# Patient Record
Sex: FEMALE | Race: BLACK OR AFRICAN AMERICAN | NOT HISPANIC OR LATINO | Employment: UNEMPLOYED | URBAN - METROPOLITAN AREA
[De-identification: names, ages, dates, MRNs, and addresses within clinical notes are randomized per-mention and may not be internally consistent; named-entity substitution may affect disease eponyms.]

---

## 2023-05-26 ENCOUNTER — LAB (OUTPATIENT)
Dept: LAB | Facility: LAB | Age: 58
End: 2023-05-26
Payer: COMMERCIAL

## 2023-05-26 ENCOUNTER — OFFICE VISIT (OUTPATIENT)
Dept: PRIMARY CARE | Facility: CLINIC | Age: 58
End: 2023-05-26
Payer: COMMERCIAL

## 2023-05-26 VITALS
HEART RATE: 71 BPM | OXYGEN SATURATION: 99 % | BODY MASS INDEX: 26.97 KG/M2 | TEMPERATURE: 97.2 F | SYSTOLIC BLOOD PRESSURE: 115 MMHG | HEIGHT: 59 IN | DIASTOLIC BLOOD PRESSURE: 81 MMHG | WEIGHT: 133.8 LBS

## 2023-05-26 DIAGNOSIS — Z12.31 BREAST CANCER SCREENING BY MAMMOGRAM: ICD-10-CM

## 2023-05-26 DIAGNOSIS — I10 PRIMARY HYPERTENSION: Primary | ICD-10-CM

## 2023-05-26 DIAGNOSIS — E05.90 SUBCLINICAL HYPERTHYROIDISM: ICD-10-CM

## 2023-05-26 DIAGNOSIS — I10 PRIMARY HYPERTENSION: ICD-10-CM

## 2023-05-26 PROBLEM — H92.09 OTALGIA: Status: ACTIVE | Noted: 2023-05-26

## 2023-05-26 PROBLEM — L30.9 ECZEMA: Status: RESOLVED | Noted: 2023-05-26 | Resolved: 2023-05-26

## 2023-05-26 PROBLEM — H18.12 BULLOUS KERATOPATHY OF LEFT EYE: Status: ACTIVE | Noted: 2023-05-26

## 2023-05-26 PROBLEM — G47.00 INSOMNIA: Status: ACTIVE | Noted: 2023-05-26

## 2023-05-26 PROBLEM — Q15.0 CONGENITAL GLAUCOMA: Status: ACTIVE | Noted: 2023-05-26

## 2023-05-26 PROBLEM — Q12.1: Status: ACTIVE | Noted: 2023-05-26

## 2023-05-26 PROBLEM — M62.838 NECK MUSCLE SPASM: Status: RESOLVED | Noted: 2023-05-26 | Resolved: 2023-05-26

## 2023-05-26 PROBLEM — H54.8 LEGALLY BLIND: Status: ACTIVE | Noted: 2023-05-26

## 2023-05-26 PROBLEM — E78.5 HYPERLIPIDEMIA: Status: ACTIVE | Noted: 2023-05-26

## 2023-05-26 PROBLEM — R79.89 ABNORMAL THYROID BLOOD TEST: Status: ACTIVE | Noted: 2023-05-26

## 2023-05-26 PROBLEM — H40.50X3: Status: ACTIVE | Noted: 2023-05-26

## 2023-05-26 PROBLEM — N63.21 BREAST LUMP ON LEFT SIDE AT 2 O'CLOCK POSITION: Status: RESOLVED | Noted: 2023-05-26 | Resolved: 2023-05-26

## 2023-05-26 PROBLEM — L73.9 FOLLICULITIS: Status: RESOLVED | Noted: 2023-05-26 | Resolved: 2023-05-26

## 2023-05-26 PROBLEM — S05.02XA ABRASION OF CORNEA, LEFT: Status: ACTIVE | Noted: 2023-05-26

## 2023-05-26 PROBLEM — R00.2 PALPITATIONS: Status: ACTIVE | Noted: 2023-05-26

## 2023-05-26 PROBLEM — L29.9 ITCHING: Status: RESOLVED | Noted: 2023-05-26 | Resolved: 2023-05-26

## 2023-05-26 PROBLEM — R63.4 WEIGHT LOSS, UNINTENTIONAL: Status: RESOLVED | Noted: 2023-05-26 | Resolved: 2023-05-26

## 2023-05-26 PROBLEM — E66.9 OBESITY, CLASS II, BMI 35-39.9: Status: ACTIVE | Noted: 2023-05-26

## 2023-05-26 PROBLEM — E66.812 OBESITY, CLASS II, BMI 35-39.9: Status: ACTIVE | Noted: 2023-05-26

## 2023-05-26 PROBLEM — H40.1190 CHRONIC OPEN ANGLE GLAUCOMA: Status: ACTIVE | Noted: 2023-05-26

## 2023-05-26 PROBLEM — Z97.0 HISTORY OF EYE PROSTHESIS: Status: ACTIVE | Noted: 2023-05-26

## 2023-05-26 PROBLEM — R53.83 FATIGUE: Status: RESOLVED | Noted: 2023-05-26 | Resolved: 2023-05-26

## 2023-05-26 PROBLEM — E83.52 HYPERCALCEMIA: Status: ACTIVE | Noted: 2023-05-26

## 2023-05-26 PROBLEM — E55.9 VITAMIN D DEFICIENCY: Status: ACTIVE | Noted: 2023-05-26

## 2023-05-26 PROBLEM — Z96.1 PSEUDOPHAKIA OF LEFT EYE: Status: ACTIVE | Noted: 2023-05-26

## 2023-05-26 PROBLEM — R20.0 LEFT ARM NUMBNESS: Status: RESOLVED | Noted: 2023-05-26 | Resolved: 2023-05-26

## 2023-05-26 LAB
ALBUMIN (G/DL) IN SER/PLAS: 4.3 G/DL (ref 3.4–5)
ANION GAP IN SER/PLAS: 10 MMOL/L (ref 10–20)
CALCIUM (MG/DL) IN SER/PLAS: 10.2 MG/DL (ref 8.6–10.6)
CARBON DIOXIDE, TOTAL (MMOL/L) IN SER/PLAS: 35 MMOL/L (ref 21–32)
CHLORIDE (MMOL/L) IN SER/PLAS: 98 MMOL/L (ref 98–107)
CREATININE (MG/DL) IN SER/PLAS: 0.66 MG/DL (ref 0.5–1.05)
GFR FEMALE: >90 ML/MIN/1.73M2
GLUCOSE (MG/DL) IN SER/PLAS: 86 MG/DL (ref 74–99)
PHOSPHATE (MG/DL) IN SER/PLAS: 3.8 MG/DL (ref 2.5–4.9)
POTASSIUM (MMOL/L) IN SER/PLAS: 3.8 MMOL/L (ref 3.5–5.3)
SODIUM (MMOL/L) IN SER/PLAS: 139 MMOL/L (ref 136–145)
THYROTROPIN (MIU/L) IN SER/PLAS BY DETECTION LIMIT <= 0.05 MIU/L: 0.3 MIU/L (ref 0.44–3.98)
THYROXINE (T4) FREE (NG/DL) IN SER/PLAS: 1.05 NG/DL (ref 0.78–1.48)
UREA NITROGEN (MG/DL) IN SER/PLAS: 12 MG/DL (ref 6–23)

## 2023-05-26 PROCEDURE — 84439 ASSAY OF FREE THYROXINE: CPT

## 2023-05-26 PROCEDURE — 80069 RENAL FUNCTION PANEL: CPT

## 2023-05-26 PROCEDURE — 3074F SYST BP LT 130 MM HG: CPT | Performed by: STUDENT IN AN ORGANIZED HEALTH CARE EDUCATION/TRAINING PROGRAM

## 2023-05-26 PROCEDURE — 36415 COLL VENOUS BLD VENIPUNCTURE: CPT

## 2023-05-26 PROCEDURE — 84443 ASSAY THYROID STIM HORMONE: CPT

## 2023-05-26 PROCEDURE — 1036F TOBACCO NON-USER: CPT | Performed by: STUDENT IN AN ORGANIZED HEALTH CARE EDUCATION/TRAINING PROGRAM

## 2023-05-26 PROCEDURE — 99213 OFFICE O/P EST LOW 20 MIN: CPT | Performed by: STUDENT IN AN ORGANIZED HEALTH CARE EDUCATION/TRAINING PROGRAM

## 2023-05-26 PROCEDURE — 3079F DIAST BP 80-89 MM HG: CPT | Performed by: STUDENT IN AN ORGANIZED HEALTH CARE EDUCATION/TRAINING PROGRAM

## 2023-05-26 RX ORDER — TRAVOPROST OPHTHALMIC SOLUTION 0.04 MG/ML
1 SOLUTION OPHTHALMIC
COMMUNITY

## 2023-05-26 RX ORDER — SIMVASTATIN 20 MG/1
20 TABLET, FILM COATED ORAL DAILY
COMMUNITY
End: 2023-09-30 | Stop reason: SDUPTHER

## 2023-05-26 RX ORDER — MOXIFLOXACIN 5 MG/ML
SOLUTION/ DROPS OPHTHALMIC
COMMUNITY
Start: 2023-01-16

## 2023-05-26 RX ORDER — BRIMONIDINE TARTRATE 2 MG/ML
SOLUTION/ DROPS OPHTHALMIC
COMMUNITY

## 2023-05-26 RX ORDER — METOPROLOL SUCCINATE 25 MG/1
25 TABLET, EXTENDED RELEASE ORAL DAILY
COMMUNITY
End: 2023-09-30 | Stop reason: SDUPTHER

## 2023-05-26 RX ORDER — DORZOLAMIDE HYDROCHLORIDE AND TIMOLOL MALEATE 20; 5 MG/ML; MG/ML
SOLUTION/ DROPS OPHTHALMIC
COMMUNITY
End: 2023-10-12 | Stop reason: SDUPTHER

## 2023-05-26 RX ORDER — LATANOPROST 50 UG/ML
SOLUTION/ DROPS OPHTHALMIC
COMMUNITY
End: 2023-10-12 | Stop reason: SDUPTHER

## 2023-05-26 RX ORDER — TRIAMTERENE AND HYDROCHLOROTHIAZIDE 37.5; 25 MG/1; MG/1
1 CAPSULE ORAL DAILY
COMMUNITY
End: 2023-09-30 | Stop reason: SDUPTHER

## 2023-05-26 NOTE — PROGRESS NOTES
"Subjective   Patient ID: Marialuisa Marie is a 57 y.o. female who presents for Follow-up.  #HTN  - Checks BPs at home occasionally, last check 106/75  - She is compliant with current regimen  - Asymptomatic today  - Denies any HA, CP/back pain, dyspnea/orthopnea, N/V, LH/dizziness    #Subclinical Hypothyrodism  - Last TSH 0.42, T4 1.24 (wnl)  - currently not having any symptoms   - On beta-blocker given history of tachycardia  - Currently not on any thyroid medications    #Mammogram  - Requesting annual mammogram,  - No personal or family history of breast cancer  - Past mammograms unremarkable        Objective     /81 (BP Location: Left arm, Patient Position: Sitting)   Pulse 71   Temp 36.2 °C (97.2 °F) (Temporal)   Ht 1.499 m (4' 11\")   Wt 60.7 kg (133 lb 12.8 oz)   SpO2 99%   BMI 27.02 kg/m²     Physical Exam  Vitals reviewed.   Eyes:      Extraocular Movements: Extraocular movements intact.   Cardiovascular:      Rate and Rhythm: Normal rate and regular rhythm.      Heart sounds: Normal heart sounds.   Pulmonary:      Effort: Pulmonary effort is normal. No respiratory distress.      Breath sounds: Normal breath sounds.   Abdominal:      General: Abdomen is flat. Bowel sounds are normal. There is no distension.      Palpations: Abdomen is soft.      Tenderness: There is no abdominal tenderness. There is no guarding.   Musculoskeletal:         General: Normal range of motion.   Skin:     General: Skin is warm and dry.   Neurological:      General: No focal deficit present.      Mental Status: She is alert and oriented to person, place, and time.       Assessment/Plan   Marialuisa Marie is a 57 y.o. female who presents for concerns below    Diagnoses and all orders for this visit:  Primary hypertension  -     Renal Function Panel; Future  Breast cancer screening by mammogram  -     BI mammo bilateral screening tomosynthesis; Future  Subclinical hyperthyroidism  -     Tsh With Reflex To Free T4 If " Abnormal; Future    #Hypertension  - Chronic, at goal  - Continue with current medications  - Reviewed most recent RFP's, stable  - Repeat screening labs today    #Subclinical hypothyroidism  - No acute symptoms or findings on physical exam  - Repeat TSH with reflex to T4 for evaluation  - Further management needed based on repeat labs    #Health maintenance  - Mammogram ordered today, patient plans to get it in June  - Patient due for Pap smear, last Pap in 02/2018, patient defers to next visit  - Patient due for Medicare annual wellness visit to be performed at next visit    Patient discussed with attending: Dr.Boyd Macho Quezada MD  Family Medicine  PGY3

## 2023-06-02 NOTE — PROGRESS NOTES
I reviewed with the resident the medical history and the resident’s findings on physical examination.  I discussed with the resident the patient’s diagnosis and concur with the treatment plan as documented in the resident note.     Follow-up in 3-6 months to reassess thyroid function: sooner if new symptoms suggestive of hyperthyroidism develop.  Kalina Richardson MD

## 2023-06-02 NOTE — PROGRESS NOTES
I reviewed with the resident the medical history and the resident’s findings on physical examination.  I discussed with the resident the patient’s diagnosis and concur with the treatment plan as documented in the resident note.     Follow-up in 3-6 months to reassess thyroid status: sooner if becomes symptomatic (new-onset fatigue, weight loss, diaphoresis, hair loss, palpitations, etc.).  Kalina Richardson MD

## 2023-06-21 DIAGNOSIS — E05.90 SUBCLINICAL HYPERTHYROIDISM: Primary | ICD-10-CM

## 2023-08-08 ENCOUNTER — APPOINTMENT (OUTPATIENT)
Dept: PRIMARY CARE | Facility: CLINIC | Age: 58
End: 2023-08-08
Payer: COMMERCIAL

## 2023-09-07 PROBLEM — H44.519 ABSOLUTE GLAUCOMA: Status: ACTIVE | Noted: 2023-09-07

## 2023-09-07 PROBLEM — T14.8XXA ABRASION OF SKIN: Status: ACTIVE | Noted: 2023-09-07

## 2023-09-07 RX ORDER — BENZONATATE 200 MG/1
CAPSULE ORAL
COMMUNITY
End: 2024-05-30 | Stop reason: SDUPTHER

## 2023-09-07 RX ORDER — BRIMONIDINE TARTRATE 1.5 MG/ML
1 SOLUTION/ DROPS OPHTHALMIC 2 TIMES DAILY
COMMUNITY
End: 2023-10-12 | Stop reason: SDUPTHER

## 2023-09-07 RX ORDER — ERYTHROMYCIN 5 MG/G
OINTMENT OPHTHALMIC 2 TIMES DAILY
COMMUNITY

## 2023-09-29 ENCOUNTER — OFFICE VISIT (OUTPATIENT)
Dept: PRIMARY CARE | Facility: CLINIC | Age: 58
End: 2023-09-29
Payer: COMMERCIAL

## 2023-09-29 VITALS
SYSTOLIC BLOOD PRESSURE: 133 MMHG | HEART RATE: 60 BPM | BODY MASS INDEX: 25.92 KG/M2 | OXYGEN SATURATION: 100 % | DIASTOLIC BLOOD PRESSURE: 90 MMHG | RESPIRATION RATE: 18 BRPM | HEIGHT: 59 IN | TEMPERATURE: 98.1 F | WEIGHT: 128.6 LBS

## 2023-09-29 DIAGNOSIS — E78.2 MIXED HYPERLIPIDEMIA: ICD-10-CM

## 2023-09-29 DIAGNOSIS — L40.9 GENERALIZED PSORIASIS: ICD-10-CM

## 2023-09-29 DIAGNOSIS — I10 PRIMARY HYPERTENSION: ICD-10-CM

## 2023-09-29 DIAGNOSIS — Z12.4 CERVICAL CANCER SCREENING: Primary | ICD-10-CM

## 2023-09-29 DIAGNOSIS — E05.90 SUBCLINICAL HYPERTHYROIDISM: ICD-10-CM

## 2023-09-29 PROCEDURE — 3080F DIAST BP >= 90 MM HG: CPT | Performed by: STUDENT IN AN ORGANIZED HEALTH CARE EDUCATION/TRAINING PROGRAM

## 2023-09-29 PROCEDURE — 87624 HPV HI-RISK TYP POOLED RSLT: CPT

## 2023-09-29 PROCEDURE — 3075F SYST BP GE 130 - 139MM HG: CPT | Performed by: STUDENT IN AN ORGANIZED HEALTH CARE EDUCATION/TRAINING PROGRAM

## 2023-09-29 PROCEDURE — 99213 OFFICE O/P EST LOW 20 MIN: CPT | Performed by: STUDENT IN AN ORGANIZED HEALTH CARE EDUCATION/TRAINING PROGRAM

## 2023-09-29 PROCEDURE — 1036F TOBACCO NON-USER: CPT | Performed by: STUDENT IN AN ORGANIZED HEALTH CARE EDUCATION/TRAINING PROGRAM

## 2023-09-29 PROCEDURE — 88175 CYTOPATH C/V AUTO FLUID REDO: CPT

## 2023-09-29 PROCEDURE — 88141 CYTOPATH C/V INTERPRET: CPT | Performed by: PATHOLOGY

## 2023-09-29 RX ORDER — HYDROCORTISONE 25 MG/G
OINTMENT TOPICAL 2 TIMES DAILY PRN
Qty: 30 G | Refills: 2 | Status: SHIPPED | OUTPATIENT
Start: 2023-09-29 | End: 2024-04-05 | Stop reason: SDUPTHER

## 2023-09-29 ASSESSMENT — PAIN SCALES - GENERAL: PAINLEVEL: 0-NO PAIN

## 2023-09-29 NOTE — PROGRESS NOTES
"Subjective   Patient ID: Marialuisa Marie is a 58 y.o. female who presents for Follow-up (Pap exam ).    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   HENT:  Negative for congestion.    Eyes:  Negative for visual disturbance.   Respiratory:  Negative for cough and shortness of breath.    Cardiovascular:  Negative for chest pain.   Gastrointestinal:  Negative for abdominal pain, diarrhea and nausea.   Genitourinary: Negative.    Musculoskeletal:  Negative for arthralgias and joint swelling.   Neurological:  Negative for dizziness and headaches.   Psychiatric/Behavioral:          No changes in mood or behavior       Objective   /90 (BP Location: Right arm, Patient Position: Sitting, BP Cuff Size: Adult)   Pulse 60   Temp 36.7 °C (98.1 °F) (Temporal)   Resp 18   Ht 1.499 m (4' 11\")   Wt 58.3 kg (128 lb 9.6 oz)   SpO2 100%   BMI 25.97 kg/m²      Physical Exam  Constitutional:       General: She is not in acute distress.  Eyes:      Comments: Wearing sunglasses due to blindness   Cardiovascular:      Rate and Rhythm: Normal rate and regular rhythm.      Heart sounds: Normal heart sounds. No murmur heard.  Pulmonary:      Effort: Pulmonary effort is normal.      Breath sounds: Normal breath sounds.   Abdominal:      General: There is no distension.      Palpations: Abdomen is soft.   Musculoskeletal:         General: Normal range of motion.      Cervical back: " Normal range of motion.   Skin:     Findings: Lesion and rash present.      Comments: Dry, flaky, scaly raised rash on bottom of scalp   Neurological:      Mental Status: She is alert and oriented to person, place, and time.   Psychiatric:         Mood and Affect: Mood normal.         Behavior: Behavior normal.       Assessment/Plan   Diagnoses and all orders for this visit:  Cervical cancer screening  Comments:  Due for pap. Performed today without any issues. Ordered co-testing given age.  Orders:  -     THINPREP PAP TEST  Generalized psoriasis  Comments:  scalp rash concernng for psorasis. Given trial of hydrocortisone cream.  Orders:  -     hydrocortisone 2.5 % ointment; Apply topically 2 times a day as needed for irritation or rash.  Mixed hyperlipidemia  Comments:  Stable. Refilled home simvastatin.  Orders:  -     simvastatin (Zocor) 20 mg tablet; Take 1 tablet (20 mg) by mouth once daily. as directed  Primary hypertension  Comments:  Stable, at goal. Refilled home dyazide and metoprolol succinate 25mg  Orders:  -     triamterene-hydrochlorothiazid (Dyazide) 37.5-25 mg capsule; Take 1 capsule by mouth once daily.  -     metoprolol succinate XL (Toprol-XL) 25 mg 24 hr tablet; Take 1 tablet (25 mg) by mouth once daily.  Subclinical hyperthyroidism  Comments:  Prior history of hyperthyroidism controlled with metoprolol. Will continue metoprol use as it has been helping her hyperthyroid sympotms.  Orders:  -     metoprolol succinate XL (Toprol-XL) 25 mg 24 hr tablet; Take 1 tablet (25 mg) by mouth once daily.  Other orders  -     Follow Up In Primary Care - Established; Future        Follow up/Return to the clinic in 1 month    Attending Supervision: discussed with attending physician, Dr. Jorge Dwyer MD  Family Medicine, PGY-3

## 2023-09-30 RX ORDER — TRIAMTERENE AND HYDROCHLOROTHIAZIDE 37.5; 25 MG/1; MG/1
1 CAPSULE ORAL DAILY
Qty: 90 CAPSULE | Refills: 3 | Status: SHIPPED | OUTPATIENT
Start: 2023-09-30 | End: 2024-04-05 | Stop reason: SDUPTHER

## 2023-09-30 RX ORDER — SIMVASTATIN 20 MG/1
20 TABLET, FILM COATED ORAL DAILY
Qty: 90 TABLET | Refills: 3 | Status: SHIPPED | OUTPATIENT
Start: 2023-09-30 | End: 2024-04-05 | Stop reason: SDUPTHER

## 2023-09-30 RX ORDER — METOPROLOL SUCCINATE 25 MG/1
25 TABLET, EXTENDED RELEASE ORAL DAILY
Qty: 90 TABLET | Refills: 3 | Status: SHIPPED | OUTPATIENT
Start: 2023-09-30 | End: 2024-09-29

## 2023-10-12 ENCOUNTER — OFFICE VISIT (OUTPATIENT)
Dept: OPHTHALMOLOGY | Facility: CLINIC | Age: 58
End: 2023-10-12
Payer: COMMERCIAL

## 2023-10-12 DIAGNOSIS — H40.1123 PRIMARY OPEN ANGLE GLAUCOMA (POAG) OF LEFT EYE, SEVERE STAGE: Primary | ICD-10-CM

## 2023-10-12 DIAGNOSIS — H18.12 BULLOUS KERATOPATHY OF LEFT EYE: ICD-10-CM

## 2023-10-12 PROCEDURE — 99212 OFFICE O/P EST SF 10 MIN: CPT | Performed by: OPHTHALMOLOGY

## 2023-10-12 PROCEDURE — 1036F TOBACCO NON-USER: CPT | Performed by: OPHTHALMOLOGY

## 2023-10-12 PROCEDURE — 76512 OPH US DX B-SCAN: CPT | Mod: LEFT SIDE | Performed by: OPHTHALMOLOGY

## 2023-10-12 RX ORDER — LATANOPROST 50 UG/ML
SOLUTION/ DROPS OPHTHALMIC
Qty: 2.5 ML | Refills: 3 | Status: SHIPPED | OUTPATIENT
Start: 2023-10-12 | End: 2024-05-21 | Stop reason: SDUPTHER

## 2023-10-12 RX ORDER — BRIMONIDINE TARTRATE 1.5 MG/ML
1 SOLUTION/ DROPS OPHTHALMIC 2 TIMES DAILY
Qty: 15 ML | Refills: 3 | Status: SHIPPED | OUTPATIENT
Start: 2023-10-12

## 2023-10-12 RX ORDER — DORZOLAMIDE HYDROCHLORIDE AND TIMOLOL MALEATE 20; 5 MG/ML; MG/ML
SOLUTION/ DROPS OPHTHALMIC
Qty: 15 ML | Refills: 3 | Status: SHIPPED | OUTPATIENT
Start: 2023-10-12 | End: 2023-11-28 | Stop reason: SDUPTHER

## 2023-10-12 ASSESSMENT — SLIT LAMP EXAM - LIDS
COMMENTS: PROSTHESIS
COMMENTS: NORMAL

## 2023-10-12 ASSESSMENT — VISUAL ACUITY
METHOD: SNELLEN - LINEAR
OS_SC: LP

## 2023-10-12 ASSESSMENT — EXTERNAL EXAM - RIGHT EYE: OD_EXAM: PROSTHESIS

## 2023-10-12 ASSESSMENT — ENCOUNTER SYMPTOMS
RESPIRATORY NEGATIVE: 0
ALLERGIC/IMMUNOLOGIC NEGATIVE: 0
CONSTITUTIONAL NEGATIVE: 0
ENDOCRINE NEGATIVE: 0
HEMATOLOGIC/LYMPHATIC NEGATIVE: 0
NEUROLOGICAL NEGATIVE: 0
CARDIOVASCULAR NEGATIVE: 0
GASTROINTESTINAL NEGATIVE: 0
EYES NEGATIVE: 0
MUSCULOSKELETAL NEGATIVE: 0
PSYCHIATRIC NEGATIVE: 0

## 2023-10-12 ASSESSMENT — TONOMETRY
OS_IOP_MMHG: 18
IOP_METHOD: GOLDMANN APPLANATION

## 2023-10-12 NOTE — PROGRESS NOTES
1-STABLE open angle glaucoma (OAG) optic nerve (ON) MEDS, REFILLED  2-DRY EYE TEARS both eyes (OU)  3-PROSTHESIS right eye (OD)  4-CORNEAL EDEMA-

## 2023-10-12 NOTE — PROGRESS NOTES
1-STABLE ENDSTAGE GLAUCOMA--SAME MEDS--REFILLED  2-PROSTHESIS right eye (OD)  3-DRY EYE TEARS  4-CORNEAL EDEMA OS

## 2023-10-14 PROBLEM — Z12.4 CERVICAL CANCER SCREENING: Status: ACTIVE | Noted: 2023-10-14

## 2023-10-14 PROBLEM — L40.9 GENERALIZED PSORIASIS: Status: ACTIVE | Noted: 2023-10-14

## 2023-10-14 ASSESSMENT — ENCOUNTER SYMPTOMS
DIARRHEA: 0
DIZZINESS: 0
ARTHRALGIAS: 0
ABDOMINAL PAIN: 0
COUGH: 0
NAUSEA: 0
FEVER: 0
JOINT SWELLING: 0
CHILLS: 0
HEADACHES: 0
SHORTNESS OF BREATH: 0

## 2023-10-16 LAB
COMPLETE PATHOLOGY REPORT: NORMAL
CONVERTED CLINICAL DIAGNOSIS-HISTORY: NORMAL
CONVERTED DIAGNOSIS COMMENT: NORMAL
CONVERTED FINAL DIAGNOSIS: NORMAL
CONVERTED FINAL REPORT PDF LINK TO COPY AND PASTE: NORMAL

## 2023-10-22 NOTE — PROGRESS NOTES
I reviewed the resident/fellow's documentation and discussed the patient with the resident/fellow. I agree with the resident/fellow's medical decision making as documented in the note.    Lnae Patel MD

## 2023-10-26 NOTE — RESULT ENCOUNTER NOTE
Tried to call patient, and left VM. Results show ASCUS with negative HPV. Per ASCCP guidelines, given patient's age and prior negative pap smears, pt is due to repeat pap in 3 years (no colpo).

## 2023-10-27 ENCOUNTER — DOCUMENTATION (OUTPATIENT)
Dept: OPHTHALMOLOGY | Facility: CLINIC | Age: 58
End: 2023-10-27
Payer: COMMERCIAL

## 2023-10-27 DIAGNOSIS — H18.12 BULLOUS KERATOPATHY OF LEFT EYE: Primary | ICD-10-CM

## 2023-10-27 DIAGNOSIS — I10 PRIMARY HYPERTENSION: ICD-10-CM

## 2023-10-27 DIAGNOSIS — H50.00 ESOTROPIA: ICD-10-CM

## 2023-10-27 RX ORDER — TOBRAMYCIN AND DEXAMETHASONE 3; 1 MG/G; MG/G
0.5 OINTMENT OPHTHALMIC 3 TIMES DAILY
COMMUNITY
End: 2023-10-27 | Stop reason: SDUPTHER

## 2023-10-27 NOTE — PROGRESS NOTES
"Per Dr. Sanchez\"    Please call in tobradex ointment, use 3-4x per day toady and tomorrow.    Sent to pharmacy and notified patient.     -Jhoana    "

## 2023-10-28 RX ORDER — TOBRAMYCIN AND DEXAMETHASONE 3; 1 MG/G; MG/G
0.5 OINTMENT OPHTHALMIC 3 TIMES DAILY
Qty: 3.5 G | Refills: 0 | Status: SHIPPED | OUTPATIENT
Start: 2023-10-28

## 2023-11-04 RX ORDER — TRIAMTERENE AND HYDROCHLOROTHIAZIDE 37.5; 25 MG/1; MG/1
1 CAPSULE ORAL DAILY
Qty: 90 CAPSULE | Refills: 3 | OUTPATIENT
Start: 2023-11-04 | End: 2024-11-03

## 2023-11-13 NOTE — RESULT ENCOUNTER NOTE
Was able to speak with patient over the phone. Patient understanding of ASCUS results and agreeable to repeat pap in 3 years base on ASCCP guidelines

## 2023-11-28 DIAGNOSIS — H40.1123 PRIMARY OPEN ANGLE GLAUCOMA (POAG) OF LEFT EYE, SEVERE STAGE: ICD-10-CM

## 2023-11-28 RX ORDER — DORZOLAMIDE HYDROCHLORIDE AND TIMOLOL MALEATE 20; 5 MG/ML; MG/ML
SOLUTION/ DROPS OPHTHALMIC
Qty: 15 ML | Refills: 3 | Status: SHIPPED | OUTPATIENT
Start: 2023-11-28

## 2023-11-30 NOTE — TELEPHONE ENCOUNTER
11.30/2021  Medication refill received, Medication refilled at [facility]   Medication Dorzolamide/Timolol with 6 refills   NO further action is required at this time,.   Prateek Huynh

## 2024-02-09 ENCOUNTER — APPOINTMENT (OUTPATIENT)
Dept: PRIMARY CARE | Facility: CLINIC | Age: 59
End: 2024-02-09
Payer: COMMERCIAL

## 2024-04-05 ENCOUNTER — OFFICE VISIT (OUTPATIENT)
Dept: PRIMARY CARE | Facility: CLINIC | Age: 59
End: 2024-04-05
Payer: COMMERCIAL

## 2024-04-05 VITALS
HEART RATE: 70 BPM | TEMPERATURE: 98 F | WEIGHT: 129 LBS | HEIGHT: 59 IN | DIASTOLIC BLOOD PRESSURE: 81 MMHG | BODY MASS INDEX: 26 KG/M2 | SYSTOLIC BLOOD PRESSURE: 116 MMHG | OXYGEN SATURATION: 99 %

## 2024-04-05 DIAGNOSIS — L40.9 GENERALIZED PSORIASIS: ICD-10-CM

## 2024-04-05 DIAGNOSIS — M79.672 FOOT PAIN, BILATERAL: ICD-10-CM

## 2024-04-05 DIAGNOSIS — E78.2 MIXED HYPERLIPIDEMIA: ICD-10-CM

## 2024-04-05 DIAGNOSIS — M79.671 FOOT PAIN, BILATERAL: ICD-10-CM

## 2024-04-05 DIAGNOSIS — E05.90 SUBCLINICAL HYPERTHYROIDISM: ICD-10-CM

## 2024-04-05 DIAGNOSIS — I10 PRIMARY HYPERTENSION: Primary | ICD-10-CM

## 2024-04-05 DIAGNOSIS — H54.8 LEGALLY BLIND: ICD-10-CM

## 2024-04-05 PROCEDURE — 3079F DIAST BP 80-89 MM HG: CPT | Performed by: STUDENT IN AN ORGANIZED HEALTH CARE EDUCATION/TRAINING PROGRAM

## 2024-04-05 PROCEDURE — 99213 OFFICE O/P EST LOW 20 MIN: CPT | Performed by: STUDENT IN AN ORGANIZED HEALTH CARE EDUCATION/TRAINING PROGRAM

## 2024-04-05 PROCEDURE — 3074F SYST BP LT 130 MM HG: CPT | Performed by: STUDENT IN AN ORGANIZED HEALTH CARE EDUCATION/TRAINING PROGRAM

## 2024-04-05 RX ORDER — SIMVASTATIN 20 MG/1
20 TABLET, FILM COATED ORAL DAILY
Qty: 90 TABLET | Refills: 3 | Status: SHIPPED | OUTPATIENT
Start: 2024-04-05 | End: 2025-04-05

## 2024-04-05 RX ORDER — HYDROCORTISONE 25 MG/G
OINTMENT TOPICAL 2 TIMES DAILY PRN
Qty: 30 G | Refills: 2 | Status: SHIPPED | OUTPATIENT
Start: 2024-04-05 | End: 2025-04-05

## 2024-04-05 RX ORDER — TRIAMTERENE AND HYDROCHLOROTHIAZIDE 37.5; 25 MG/1; MG/1
1 CAPSULE ORAL DAILY
Qty: 90 CAPSULE | Refills: 3 | Status: SHIPPED | OUTPATIENT
Start: 2024-04-05 | End: 2025-04-05

## 2024-04-05 ASSESSMENT — PAIN SCALES - GENERAL: PAINLEVEL: 0-NO PAIN

## 2024-04-05 NOTE — PROGRESS NOTES
"Subjective   Patient ID: Marialuisa Marie is a 58 y.o. female who presents for Follow-up.    HPI      #Blindness  - following w/ optho  - use of blind cane  - asking for podiatry referral to help trim toenails    # HTN  - Current medication: hydrochlorothiazide-triamterene  - Frequency of home BP checks: about once a day  - Home blood pressure ranges: 100-105/70s  - Denies headache, chest pain, SOB, palpitations, edema, dizziness, and LOC/syncope.     #Subclinical Hyperthyroidism  - currently on metoprolol for it  - pt wants to know if she still needs it  - denies any hyperthyroidism symptoms  - denies neck enlargement    Review of Systems   All other systems reviewed and are negative.      Objective   /81 (BP Location: Right arm, Patient Position: Sitting)   Pulse 70   Temp 36.7 °C (98 °F)   Ht 1.499 m (4' 11\")   Wt 58.5 kg (129 lb)   SpO2 99%   BMI 26.05 kg/m²      Physical Exam  Constitutional:       General: She is not in acute distress.  HENT:      Head: Normocephalic and atraumatic.   Eyes:      Comments: Wearing dark glasses   Cardiovascular:      Heart sounds: Normal heart sounds. No murmur heard.  Pulmonary:      Effort: Pulmonary effort is normal.      Breath sounds: Normal breath sounds.   Abdominal:      Palpations: Abdomen is soft.   Musculoskeletal:         General: Normal range of motion.      Right foot: Normal range of motion.      Left foot: Normal range of motion. Bunion present.   Feet:      Right foot:      Toenail Condition: Right toenails are long.      Left foot:      Toenail Condition: Left toenails are long.   Neurological:      Mental Status: She is alert and oriented to person, place, and time.      Comments: Legally blind   Psychiatric:         Mood and Affect: Mood normal.          Assessment/Plan   Diagnoses and all orders for this visit:  Primary hypertension  Comments:  Stable, at goal. Refilled home dyazide. Ordered CMP to monitor Cr and electrolytes.  Orders:  -     " triamterene-hydrochlorothiazid (Dyazide) 37.5-25 mg capsule; Take 1 capsule by mouth once daily.  -     Comprehensive metabolic panel; Future  Mixed hyperlipidemia  Comments:  Stable. Refilled home simvastatin. Ordered lipid panel and LFTs for surveillance.  Orders:  -     simvastatin (Zocor) 20 mg tablet; Take 1 tablet (20 mg) by mouth once daily. as directed  -     Lipid panel; Future  Subclinical hyperthyroidism  Comments:  Prior dx of hyperthyroidism controlled w/ metoprolol. Will not refill metoprolol as may no longer be needed given side effects. Ordered repeat thyroid testing  Orders:  -     TSH; Future  -     T3, free; Future  -     T4; Future  Generalized psoriasis  Comments:  Scalp rash c/f psorasis, improved in one spot now w/ new spot. Refilled hydrocortisone cream as it helped. May consider punch bx & Derm referral to next visit  Orders:  -     hydrocortisone 2.5 % ointment; Apply topically 2 times a day as needed for irritation or rash.  -     Referral to Dermatology  Legally blind  -     Referral to Podiatry; Future  Foot pain, bilateral  Comments:  Foot pain from long toenails. No fungus or wounds on feet. Referred to Podiatry for nail trimming per pt preference  Orders:  -     Referral to Podiatry; Future      Follow up/Return to the clinic in 1 month    Attending Supervision: discussed with attending physician, Dr. Dylan Dwyer MD  Family Medicine, PGY-3

## 2024-04-06 PROBLEM — E66.9 OBESITY, CLASS II, BMI 35-39.9: Status: RESOLVED | Noted: 2023-05-26 | Resolved: 2024-04-06

## 2024-04-06 PROBLEM — M79.671 FOOT PAIN, BILATERAL: Status: ACTIVE | Noted: 2024-04-06

## 2024-04-06 PROBLEM — H92.09 OTALGIA: Status: RESOLVED | Noted: 2023-05-26 | Resolved: 2024-04-06

## 2024-04-06 PROBLEM — E83.52 HYPERCALCEMIA: Status: RESOLVED | Noted: 2023-05-26 | Resolved: 2024-04-06

## 2024-04-06 PROBLEM — T14.8XXA ABRASION OF SKIN: Status: RESOLVED | Noted: 2023-09-07 | Resolved: 2024-04-06

## 2024-04-06 PROBLEM — R00.2 PALPITATIONS: Status: RESOLVED | Noted: 2023-05-26 | Resolved: 2024-04-06

## 2024-04-06 PROBLEM — G47.00 INSOMNIA: Status: RESOLVED | Noted: 2023-05-26 | Resolved: 2024-04-06

## 2024-04-06 PROBLEM — E66.812 OBESITY, CLASS II, BMI 35-39.9: Status: RESOLVED | Noted: 2023-05-26 | Resolved: 2024-04-06

## 2024-04-06 PROBLEM — E55.9 VITAMIN D DEFICIENCY: Status: RESOLVED | Noted: 2023-05-26 | Resolved: 2024-04-06

## 2024-04-06 PROBLEM — R79.89 ABNORMAL THYROID BLOOD TEST: Status: RESOLVED | Noted: 2023-05-26 | Resolved: 2024-04-06

## 2024-04-06 PROBLEM — M79.672 FOOT PAIN, BILATERAL: Status: ACTIVE | Noted: 2024-04-06

## 2024-04-12 ENCOUNTER — LAB (OUTPATIENT)
Dept: LAB | Facility: LAB | Age: 59
End: 2024-04-12
Payer: COMMERCIAL

## 2024-04-12 DIAGNOSIS — E05.90 SUBCLINICAL HYPERTHYROIDISM: ICD-10-CM

## 2024-04-12 DIAGNOSIS — I10 PRIMARY HYPERTENSION: ICD-10-CM

## 2024-04-12 DIAGNOSIS — E78.2 MIXED HYPERLIPIDEMIA: ICD-10-CM

## 2024-04-12 LAB
ALBUMIN SERPL BCP-MCNC: 4.4 G/DL (ref 3.4–5)
ALP SERPL-CCNC: 76 U/L (ref 33–110)
ALT SERPL W P-5'-P-CCNC: 14 U/L (ref 7–45)
ANION GAP SERPL CALC-SCNC: 15 MMOL/L (ref 10–20)
AST SERPL W P-5'-P-CCNC: 15 U/L (ref 9–39)
BILIRUB SERPL-MCNC: 0.4 MG/DL (ref 0–1.2)
BUN SERPL-MCNC: 11 MG/DL (ref 6–23)
CALCIUM SERPL-MCNC: 10.2 MG/DL (ref 8.6–10.6)
CHLORIDE SERPL-SCNC: 98 MMOL/L (ref 98–107)
CHOLEST SERPL-MCNC: 171 MG/DL (ref 0–199)
CHOLESTEROL/HDL RATIO: 3.1
CO2 SERPL-SCNC: 32 MMOL/L (ref 21–32)
CREAT SERPL-MCNC: 0.67 MG/DL (ref 0.5–1.05)
EGFRCR SERPLBLD CKD-EPI 2021: >90 ML/MIN/1.73M*2
GLUCOSE SERPL-MCNC: 90 MG/DL (ref 74–99)
HDLC SERPL-MCNC: 54.9 MG/DL
LDLC SERPL CALC-MCNC: 100 MG/DL
NON HDL CHOLESTEROL: 116 MG/DL (ref 0–149)
POTASSIUM SERPL-SCNC: 3.7 MMOL/L (ref 3.5–5.3)
PROT SERPL-MCNC: 8.1 G/DL (ref 6.4–8.2)
SODIUM SERPL-SCNC: 141 MMOL/L (ref 136–145)
T3FREE SERPL-MCNC: 3.1 PG/ML (ref 2.3–4.2)
T4 SERPL-MCNC: 11.6 UG/DL (ref 4.5–11.1)
TRIGL SERPL-MCNC: 79 MG/DL (ref 0–149)
TSH SERPL-ACNC: 0.38 MIU/L (ref 0.44–3.98)
VLDL: 16 MG/DL (ref 0–40)

## 2024-04-12 PROCEDURE — 80053 COMPREHEN METABOLIC PANEL: CPT

## 2024-04-12 PROCEDURE — 84443 ASSAY THYROID STIM HORMONE: CPT

## 2024-04-12 PROCEDURE — 84481 FREE ASSAY (FT-3): CPT

## 2024-04-12 PROCEDURE — 36415 COLL VENOUS BLD VENIPUNCTURE: CPT

## 2024-04-12 PROCEDURE — 84436 ASSAY OF TOTAL THYROXINE: CPT

## 2024-04-12 PROCEDURE — 80061 LIPID PANEL: CPT

## 2024-04-12 NOTE — PROGRESS NOTES
I reviewed the resident/fellow's documentation and discussed the patient with the resident/fellow. I agree with the resident/fellow's medical decision making as documented in their note with the exception/addition of the following:    Patient presented today for medication management and evaluation of chronic condition(s).  Kalina Richardson MD

## 2024-04-17 ENCOUNTER — APPOINTMENT (OUTPATIENT)
Dept: RADIOLOGY | Facility: HOSPITAL | Age: 59
End: 2024-04-17
Payer: COMMERCIAL

## 2024-04-17 ENCOUNTER — HOSPITAL ENCOUNTER (EMERGENCY)
Facility: HOSPITAL | Age: 59
Discharge: HOME | End: 2024-04-17
Payer: COMMERCIAL

## 2024-04-17 VITALS
OXYGEN SATURATION: 98 % | RESPIRATION RATE: 16 BRPM | SYSTOLIC BLOOD PRESSURE: 136 MMHG | TEMPERATURE: 97.2 F | HEART RATE: 84 BPM | DIASTOLIC BLOOD PRESSURE: 88 MMHG

## 2024-04-17 DIAGNOSIS — M54.50 ACUTE RIGHT-SIDED LOW BACK PAIN WITHOUT SCIATICA: ICD-10-CM
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
## 2024-04-17 PROCEDURE — 99283 EMERGENCY DEPT VISIT LOW MDM: CPT

## 2024-04-17 PROCEDURE — 72100 X-RAY EXAM L-S SPINE 2/3 VWS: CPT | Mod: FOREIGN READ | Performed by: RADIOLOGY

## 2024-04-17 PROCEDURE — 2500000006 HC RX 250 W HCPCS SELF ADMINISTERED DRUGS (ALT 637 FOR ALL PAYERS): Performed by: NURSE PRACTITIONER

## 2024-04-17 PROCEDURE — 72100 X-RAY EXAM L-S SPINE 2/3 VWS: CPT

## 2024-04-17 PROCEDURE — 99284 EMERGENCY DEPT VISIT MOD MDM: CPT | Performed by: NURSE PRACTITIONER

## 2024-04-17 PROCEDURE — 2500000001 HC RX 250 WO HCPCS SELF ADMINISTERED DRUGS (ALT 637 FOR MEDICARE OP): Performed by: NURSE PRACTITIONER

## 2024-04-17 RX ORDER — IBUPROFEN 600 MG/1
600 TABLET ORAL ONCE
Status: COMPLETED | OUTPATIENT
Start: 2024-04-17 | End: 2024-04-17

## 2024-04-17 RX ORDER — ORPHENADRINE CITRATE 100 MG/1
100 TABLET, EXTENDED RELEASE ORAL ONCE
Status: COMPLETED | OUTPATIENT
Start: 2024-04-17 | End: 2024-04-17

## 2024-04-17 RX ORDER — IBUPROFEN 600 MG/1
600 TABLET ORAL EVERY 6 HOURS PRN
Qty: 20 TABLET | Refills: 0 | Status: SHIPPED | OUTPATIENT
Start: 2024-04-17 | End: 2024-04-24

## 2024-04-17 RX ORDER — ORPHENADRINE CITRATE 100 MG/1
100 TABLET, EXTENDED RELEASE ORAL 2 TIMES DAILY PRN
Qty: 14 TABLET | Refills: 0 | Status: SHIPPED | OUTPATIENT
Start: 2024-04-17

## 2024-04-17 RX ADMIN — ORPHENADRINE CITRATE 100 MG: 100 TABLET, EXTENDED RELEASE ORAL at 09:47

## 2024-04-17 RX ADMIN — IBUPROFEN 600 MG: 600 TABLET, FILM COATED ORAL at 09:47

## 2024-04-17 ASSESSMENT — COLUMBIA-SUICIDE SEVERITY RATING SCALE - C-SSRS
6. HAVE YOU EVER DONE ANYTHING, STARTED TO DO ANYTHING, OR PREPARED TO DO ANYTHING TO END YOUR LIFE?: NO
2. HAVE YOU ACTUALLY HAD ANY THOUGHTS OF KILLING YOURSELF?: NO
1. IN THE PAST MONTH, HAVE YOU WISHED YOU WERE DEAD OR WISHED YOU COULD GO TO SLEEP AND NOT WAKE UP?: NO
6. HAVE YOU EVER DONE ANYTHING, STARTED TO DO ANYTHING, OR PREPARED TO DO ANYTHING TO END YOUR LIFE?: NO

## 2024-04-17 NOTE — ED TRIAGE NOTES
Pt says she was sitting down on toilet on Sunday and 'missed the toilet,' fell to the ground, c/o back pain. Says she's been ambulatory at home since falling

## 2024-04-17 NOTE — ED PROVIDER NOTES
HPI   Chief Complaint   Patient presents with    Back Pain       HPI  This is a 58 year old female who is legally blind is here after she fell and hit the right lower back.   Has had NO urinary symptoms and would like imaging to make sure that everything is ok.   The fall was entirely mechanical.                   No data recorded                   Patient History   Past Medical History:   Diagnosis Date    Aphakia, left eye 02/13/2017    Aphakia of left eye    Aphakia, unspecified eye 02/13/2017    Aphakic corneal edema    Breast lump on left side at 2 o'clock position 05/26/2023    Congenital glaucoma 06/22/2021    Congenital glaucoma    Left arm numbness 05/26/2023    Neck muscle spasm 05/26/2023    Weight loss, unintentional 05/26/2023     History reviewed. No pertinent surgical history.  Family History   Problem Relation Name Age of Onset    Hypertension Mother      Hypertension Other Family History      Social History     Tobacco Use    Smoking status: Never    Smokeless tobacco: Never   Substance Use Topics    Alcohol use: Never    Drug use: Never       Physical Exam   ED Triage Vitals [04/17/24 0850]   Temperature Heart Rate Respirations BP   36.2 °C (97.2 °F) 84 16 136/88      Pulse Ox Temp Source Heart Rate Source Patient Position   98 % Temporal -- --      BP Location FiO2 (%)     -- --       Physical Exam  Constitutional:       Appearance: Normal appearance.   HENT:      Head: Normocephalic and atraumatic.      Nose: Nose normal.      Mouth/Throat:      Mouth: Mucous membranes are moist.   Cardiovascular:      Rate and Rhythm: Normal rate and regular rhythm.   Pulmonary:      Effort: Pulmonary effort is normal.      Breath sounds: Normal breath sounds.   Abdominal:      Palpations: Abdomen is soft.   Musculoskeletal:         General: No swelling, tenderness, deformity or signs of injury. Normal range of motion.      Cervical back: Normal range of motion and neck supple.   Skin:     General: Skin is warm  and dry.   Neurological:      General: No focal deficit present.      Mental Status: She is alert and oriented to person, place, and time.   Psychiatric:         Mood and Affect: Mood normal.         Behavior: Behavior normal.         ED Course & MDM   Diagnoses as of 04/17/24 1151   Fall, initial encounter   Acute right-sided low back pain without sciatica       Medical Decision Making  This is a 58 year old female who is legally blind is here after she fell and hit the right lower back.   Has had NO urinary symptoms and would like imaging to make sure that everything is ok.   The fall was entirely mechanical.   Differential Dx- fracture, musculoskeletal pain, strain   The x-ray of her LS did not show any acute findings. She was given Motrin and Norflex with some improvement and was discharged home with the same. She was instructed to return to the ED with any concerning or worsening symptoms.         Procedure  Procedures     ADAN Aly, KATERIN  04/19/24 0603       ADAN Aly, KATERIN  04/19/24 0613

## 2024-04-18 ENCOUNTER — APPOINTMENT (OUTPATIENT)
Dept: OPHTHALMOLOGY | Facility: CLINIC | Age: 59
End: 2024-04-18
Payer: COMMERCIAL

## 2024-04-25 NOTE — RESULT ENCOUNTER NOTE
Normal results. TSH and T4 are borderline, but T3 completely normal. Will continue to hold off on metoprolol. Can reevaluate at next visit. No No further patient communication needed at this time.

## 2024-04-30 ENCOUNTER — TELEMEDICINE (OUTPATIENT)
Dept: PRIMARY CARE | Facility: CLINIC | Age: 59
End: 2024-04-30
Payer: COMMERCIAL

## 2024-04-30 DIAGNOSIS — I10 PRIMARY HYPERTENSION: Primary | ICD-10-CM

## 2024-04-30 PROCEDURE — 1036F TOBACCO NON-USER: CPT

## 2024-04-30 PROCEDURE — 99442 PR PHYS/QHP TELEPHONE EVALUATION 11-20 MIN: CPT

## 2024-04-30 RX ORDER — CETIRIZINE HYDROCHLORIDE 10 MG/1
TABLET ORAL
COMMUNITY
Start: 2019-02-11

## 2024-04-30 RX ORDER — TRIAMCINOLONE ACETONIDE 1 MG/G
30 OINTMENT TOPICAL EVERY 12 HOURS
COMMUNITY
Start: 2019-02-11

## 2024-04-30 RX ORDER — EMOLLIENT COMBINATION NO.110
240 LOTION (ML) TOPICAL
COMMUNITY
Start: 2019-01-03

## 2024-04-30 ASSESSMENT — ENCOUNTER SYMPTOMS
LIGHT-HEADEDNESS: 0
SHORTNESS OF BREATH: 0
BACK PAIN: 0

## 2024-04-30 NOTE — PROGRESS NOTES
I reviewed the resident/fellow's documentation and discussed the patient with the resident/fellow. I agree with the resident/fellow's medical decision making as documented in the note.   Dr. Parmar advised that she d/c her Beta blocker as was recommended earlier.    Noemí Mary MD

## 2024-04-30 NOTE — PROGRESS NOTES
Subjective   Patient ID:   Marialuisa Marie is a 58 y.o. female who presents for Follow-up.  HPI  #s/p Fall  #ED follow up  - Reported she was seen in the ER after falling at home  - She reported that she felt dizzy that morning and is unsure if she simply missed the toilet when sitting or if she was dizzy  - The patient reported that she is blind  - She denied any pain and reported she no longer takes any pain medications    #HTN  - Checks BP everyday and reported that her BP is usually in a normal range but had a SBP reading of 105 and another in the 90s  - She is concerned that her medication may be too strong due to having dizziness the day of the fall  - She reports taking triamterene-hydrochlorothiazide and metoprolol daily  - She denied any recent dizziness or headaches      Review of Systems   Respiratory:  Negative for shortness of breath.    Cardiovascular:  Negative for chest pain.   Musculoskeletal:  Negative for back pain.   Neurological:  Negative for light-headedness.       Objective   There were no vitals taken for this visit.   Physical Exam  Constitutional:       General: She is not in acute distress.     Comments: Exam was limited due to telephone visit.   Pulmonary:      Effort: Pulmonary effort is normal. No respiratory distress.      Breath sounds: Normal breath sounds.   Psychiatric:         Mood and Affect: Mood normal.         Assessment/Plan     Problem List Items Addressed This Visit    None  Marialuisa Marie is a  58 year old female who presents for a telephone visit for a ED follow up.    #ED follow up  - No longer in any pain  - Patient remains mobile    #HTN  - Encouraged patient to keep a BP log and bring it to her follow up appoiuntment    - c/w triamterene-hydrochlorothiazide 37.5-25 mg every day  - Informed patient to hold metoprolol 25 mg every day  - Follow up in 2-4 weeks       RTC in 4 weeks for HTN follow up    The patient was discussed with Dr. Mary.    Timo Parmar MD  Family  Medicine   PGY-2

## 2024-05-21 DIAGNOSIS — H40.1123 PRIMARY OPEN ANGLE GLAUCOMA (POAG) OF LEFT EYE, SEVERE STAGE: ICD-10-CM

## 2024-05-21 RX ORDER — LATANOPROST 50 UG/ML
SOLUTION/ DROPS OPHTHALMIC
Qty: 2.5 ML | Refills: 3 | Status: SHIPPED | OUTPATIENT
Start: 2024-05-21

## 2024-05-30 ENCOUNTER — OFFICE VISIT (OUTPATIENT)
Dept: PRIMARY CARE | Facility: CLINIC | Age: 59
End: 2024-05-30
Payer: COMMERCIAL

## 2024-05-30 VITALS
BODY MASS INDEX: 26.61 KG/M2 | WEIGHT: 132 LBS | SYSTOLIC BLOOD PRESSURE: 137 MMHG | HEIGHT: 59 IN | HEART RATE: 87 BPM | DIASTOLIC BLOOD PRESSURE: 94 MMHG | TEMPERATURE: 97.5 F | OXYGEN SATURATION: 100 %

## 2024-05-30 DIAGNOSIS — I10 PRIMARY HYPERTENSION: ICD-10-CM

## 2024-05-30 DIAGNOSIS — R05.1 ACUTE COUGH: ICD-10-CM

## 2024-05-30 DIAGNOSIS — S22.000A COMPRESSION FRACTURE OF BODY OF THORACIC VERTEBRA (MULTI): Primary | ICD-10-CM

## 2024-05-30 PROCEDURE — 3080F DIAST BP >= 90 MM HG: CPT

## 2024-05-30 PROCEDURE — 99213 OFFICE O/P EST LOW 20 MIN: CPT

## 2024-05-30 PROCEDURE — 1036F TOBACCO NON-USER: CPT

## 2024-05-30 PROCEDURE — 3075F SYST BP GE 130 - 139MM HG: CPT

## 2024-05-30 RX ORDER — ALENDRONATE SODIUM 70 MG/1
70 TABLET ORAL
Qty: 4 TABLET | Refills: 11 | Status: SHIPPED | OUTPATIENT
Start: 2024-05-30 | End: 2025-05-30

## 2024-05-30 RX ORDER — CYANOCOBALAMIN (VITAMIN B-12) 500 MCG
1000 TABLET ORAL DAILY
Qty: 75 TABLET | Refills: 11 | Status: SHIPPED | OUTPATIENT
Start: 2024-05-30 | End: 2025-05-30

## 2024-05-30 RX ORDER — GUAIFENESIN 600 MG/1
1200 TABLET, EXTENDED RELEASE ORAL 2 TIMES DAILY
Qty: 120 TABLET | Refills: 1 | Status: SHIPPED | OUTPATIENT
Start: 2024-05-30 | End: 2024-07-29

## 2024-05-30 RX ORDER — BENZONATATE 200 MG/1
200 CAPSULE ORAL 3 TIMES DAILY PRN
Qty: 30 CAPSULE | Refills: 0 | Status: SHIPPED | OUTPATIENT
Start: 2024-05-30

## 2024-05-30 ASSESSMENT — ENCOUNTER SYMPTOMS
BACK PAIN: 0
SHORTNESS OF BREATH: 0
COUGH: 1

## 2024-05-30 ASSESSMENT — PAIN SCALES - GENERAL: PAINLEVEL: 0-NO PAIN

## 2024-05-30 NOTE — PROGRESS NOTES
"Subjective   Patient ID:   Marialuisa Marie is a 58 y.o. female who presents for Follow-up.  HPI  #HTN  - Checks BP at home 3 times per day  - uses a wrist cuff that talks to her  - Average readings at home 110-120s/70-80s  - Takes her BP medications daily  - Denied any headaches, lightheadedness, dizziness, or falls    #Cough  - Started this week  - Reports coughing up phlegm  - Denied any fever, chills, sick contacts, travel or coughing fits  - Denied ever smoking or any seasonal allergies  - No history of asthma or inhaler use  - Used mucinex in the past which helped    #Hx of Compression fracture  #c/f Osteoporosis  - Lumbar x-ray in the ED showed a old compression fracture  - Patient denied any trauma, falls, or back pain that may have caused it  - Reports she has no pain      Review of Systems   Respiratory:  Positive for cough. Negative for shortness of breath.    Cardiovascular:  Negative for chest pain.   Musculoskeletal:  Negative for back pain.       Objective   BP (!) 149/99 (BP Location: Left arm, Patient Position: Sitting)   Pulse 87   Temp 36.4 °C (97.5 °F) (Temporal)   Ht 1.499 m (4' 11\")   Wt 59.9 kg (132 lb)   SpO2 100%   BMI 26.66 kg/m²    Physical Exam  Constitutional:       General: She is not in acute distress.     Appearance: Normal appearance. She is not ill-appearing.   HENT:      Head: Normocephalic and atraumatic.   Cardiovascular:      Rate and Rhythm: Normal rate and regular rhythm.      Heart sounds: Normal heart sounds. No murmur heard.     No friction rub. No gallop.   Pulmonary:      Effort: Pulmonary effort is normal. No respiratory distress.      Breath sounds: Normal breath sounds. No stridor. No wheezing, rhonchi or rales.   Musculoskeletal:      Cervical back: Normal range of motion.   Neurological:      General: No focal deficit present.      Mental Status: She is alert.   Psychiatric:         Mood and Affect: Mood normal.         Behavior: Behavior normal. "         Assessment/Plan     Problem List Items Addressed This Visit    None  Marialuisa Marie is a 58 year old female with a PMHx of HTN, HLD, and subclinical hypothyroidism who presents to the clinic for a follow up.    #HTN  - IO /99, repeat 137/94  - Ordered arm BP cuff  - c/w Triamterene-hydrochlorothiazide 37.5-25 mg every day & Metoprolol 25 mg every day  - Encouraged to continue taking BP regularly and keep a log    #Acute cough  - Ordered Mucinex & Tessalon for symptomatic relief    #c/f Osteoporosis  #Hx of compression fracture of T12  - Lumbar X-ray from 4/17/2024 showed a age indeterminate compression deformity of T12 w/ ~20% loss of vertebral height  - Ordered baseline Dexa scan  - Will treat with Alendronate 70 mg Qweekly  - Ordered vitamin D supplementaion  - Discussed lifestyle modifications such as exercise and diet     RTC in 6 months for HM visit       The patient was discussed with Dr. Macdonald.    Timo Parmar MD  Family Medicine   PGY-2

## 2024-05-30 NOTE — PROGRESS NOTES
Agree with Resident and/or Medical student plan for starting Osteoporosis treatment for recent compression fracture in addition to ambulatory BP monitoring for BP above goal.     Patient discussed with attending, Dr. Torres Whaley MD  Family Medicine, PGY-3

## 2024-06-04 NOTE — PROGRESS NOTES
I reviewed the resident/fellow's documentation and discussed the patient with the resident/fellow. I agree with the resident/fellow's medical decision making as documented in the note.   Nontraumatic vertebral compression fracture in a postmenopausal woman is an indication for bisphosphonate treatment, unless DEXA proves normal.  Careul instructions for taking this oral medication: to be taken on an empty stomach with water, only, and to remain upright and npo for at least 30 minutes afterward.     Tori Macdonald MD

## 2024-06-17 ENCOUNTER — APPOINTMENT (OUTPATIENT)
Dept: PODIATRY | Facility: HOSPITAL | Age: 59
End: 2024-06-17
Payer: COMMERCIAL

## 2024-07-16 ENCOUNTER — HOSPITAL ENCOUNTER (OUTPATIENT)
Dept: RADIOLOGY | Facility: HOSPITAL | Age: 59
Discharge: HOME | End: 2024-07-16
Payer: COMMERCIAL

## 2024-07-16 VITALS — HEIGHT: 59 IN | BODY MASS INDEX: 26.62 KG/M2 | WEIGHT: 132.06 LBS

## 2024-07-16 DIAGNOSIS — Z12.31 ENCOUNTER FOR SCREENING MAMMOGRAM FOR MALIGNANT NEOPLASM OF BREAST: ICD-10-CM

## 2024-07-16 PROCEDURE — 77067 SCR MAMMO BI INCL CAD: CPT | Performed by: RADIOLOGY

## 2024-07-16 PROCEDURE — 77063 BREAST TOMOSYNTHESIS BI: CPT

## 2024-07-16 PROCEDURE — 77063 BREAST TOMOSYNTHESIS BI: CPT | Performed by: RADIOLOGY

## 2024-09-11 ENCOUNTER — APPOINTMENT (OUTPATIENT)
Dept: PODIATRY | Facility: CLINIC | Age: 59
End: 2024-09-11
Payer: COMMERCIAL

## 2024-09-11 DIAGNOSIS — M20.11 HALLUX VALGUS, BILATERAL: Primary | ICD-10-CM

## 2024-09-11 DIAGNOSIS — H54.8 LEGALLY BLIND: ICD-10-CM

## 2024-09-11 DIAGNOSIS — M20.12 HALLUX VALGUS, BILATERAL: Primary | ICD-10-CM

## 2024-09-11 DIAGNOSIS — M79.671 FOOT PAIN, BILATERAL: ICD-10-CM

## 2024-09-11 DIAGNOSIS — M20.5X1 ADDUCTOVARUS ROTATION OF TOE, ACQUIRED, RIGHT: ICD-10-CM

## 2024-09-11 DIAGNOSIS — B35.1 ONYCHOMYCOSIS: ICD-10-CM

## 2024-09-11 DIAGNOSIS — M79.672 FOOT PAIN, BILATERAL: ICD-10-CM

## 2024-09-11 PROCEDURE — 1036F TOBACCO NON-USER: CPT | Performed by: PODIATRIST

## 2024-09-11 PROCEDURE — 99203 OFFICE O/P NEW LOW 30 MIN: CPT | Performed by: PODIATRIST

## 2024-09-11 PROCEDURE — 99213 OFFICE O/P EST LOW 20 MIN: CPT | Performed by: PODIATRIST

## 2024-09-11 ASSESSMENT — ENCOUNTER SYMPTOMS
GASTROINTESTINAL NEGATIVE: 1
HEMATOLOGIC/LYMPHATIC NEGATIVE: 1
ENDOCRINE NEGATIVE: 1
PSYCHIATRIC NEGATIVE: 1
CONSTITUTIONAL NEGATIVE: 1
NEUROLOGICAL NEGATIVE: 1
ALLERGIC/IMMUNOLOGIC NEGATIVE: 1
CARDIOVASCULAR NEGATIVE: 1
RESPIRATORY NEGATIVE: 1

## 2024-09-11 NOTE — PROGRESS NOTES
Chief Complaint   Patient presents with    nail care     New Patient is here today for a corn 5th toe right foot and nail care.PCP referred.        Right foot pain toe 5, duration few months. Thinks shoe related. Painful on the treadmill.  This is a first occurrence.  No known trauma.  She also complains of bilateral foot pain secondary to painful nail pathology.  Nails are thick and elongated.  Patient is legally blind.  She has no other complaints or therapy at this time.    Review of Systems   Constitutional: Negative.    Eyes:  Positive for visual disturbance.   Respiratory: Negative.     Cardiovascular: Negative.    Gastrointestinal: Negative.    Endocrine: Negative.    Musculoskeletal:         Foot pain     Skin: Negative.    Allergic/Immunologic: Negative.    Neurological: Negative.    Hematological: Negative.    Psychiatric/Behavioral: Negative.       General/Constitutional: Alert. NAD.   Respiratory: Non labored breathing.   Psychiatric: Mood and affect normal/baseline.   HEENT: Sclera clear. Wearing corrective lenses.  Dermatologic: Nails are elongated and hypertrophic crumbly and yellow. Painful to palpation. No acute inflammatory infectious process. Web spaces are dry. No ulcers no pre-ulcerative areas.  HD 5 right foot.  Vascular: Pedal pulses are intact and symmetric including the dorsalis pedis and the posterior tibial pulses. Feet are warm to touch. No swelling appreciated either extremity.  Neurological: Alert and oriented. No acute distress. No sensory impairment bilateral.  Musculoskeletal: Strength is normal for age. No acute deficits appreciated.  Adductovarus fifth digit right foot.  Hallux valgus bilaterally.    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  Onychomycosis.  Hallux valgus deformity.  Legally blind.    -Today's treatment and course of therapy was discussed with the patient in detail. Patient's questions were answered. Proper foot care was discussed. This dictation was  done using Dragon computer software and as such may contain grammatical errors.    -Nail debridement was performed this was a greater than 6 nails. Nails were manually debrided.  They were decreased and girth in length. Appropriate care was discussed. Preventative care was reviewed. Follow-up in about 2 months unless there is any other problems.    -Superficial paring hyperkeratotic HD 5 right foot.  Protective bandage applied.  She can do the same as well at home.  As needed.    -Counseled patient on proper footcare as well as proper shoes wide width would be beneficial for the deformities.  Encouraged shoes indoors rather than going barefoot.  Patient understanding of same.

## 2024-09-19 ENCOUNTER — APPOINTMENT (OUTPATIENT)
Dept: OPHTHALMOLOGY | Facility: CLINIC | Age: 59
End: 2024-09-19
Payer: COMMERCIAL

## 2024-09-19 DIAGNOSIS — H40.1123 PRIMARY OPEN ANGLE GLAUCOMA (POAG) OF LEFT EYE, SEVERE STAGE: ICD-10-CM

## 2024-09-19 PROCEDURE — 99214 OFFICE O/P EST MOD 30 MIN: CPT | Performed by: OPHTHALMOLOGY

## 2024-09-19 RX ORDER — LATANOPROST 50 UG/ML
SOLUTION/ DROPS OPHTHALMIC
Qty: 2.5 ML | Refills: 3 | Status: SHIPPED | OUTPATIENT
Start: 2024-09-19

## 2024-09-19 RX ORDER — BRIMONIDINE TARTRATE 2 MG/ML
1 SOLUTION/ DROPS OPHTHALMIC 2 TIMES DAILY
Qty: 15 ML | Refills: 3 | Status: SHIPPED | OUTPATIENT
Start: 2024-09-19 | End: 2024-12-18

## 2024-09-19 RX ORDER — DORZOLAMIDE HYDROCHLORIDE AND TIMOLOL MALEATE 20; 5 MG/ML; MG/ML
SOLUTION/ DROPS OPHTHALMIC
Qty: 15 ML | Refills: 3 | Status: SHIPPED | OUTPATIENT
Start: 2024-09-19

## 2024-09-19 RX ORDER — DORZOLAMIDE HYDROCHLORIDE AND TIMOLOL MALEATE 20; 5 MG/ML; MG/ML
SOLUTION/ DROPS OPHTHALMIC
Qty: 15 ML | Refills: 3 | Status: SHIPPED | OUTPATIENT
Start: 2024-09-19 | End: 2024-09-19

## 2024-09-19 RX ORDER — BRIMONIDINE TARTRATE 1.5 MG/ML
1 SOLUTION/ DROPS OPHTHALMIC 2 TIMES DAILY
Qty: 15 ML | Refills: 3 | Status: SHIPPED | OUTPATIENT
Start: 2024-09-19 | End: 2024-09-19

## 2024-09-19 ASSESSMENT — EXTERNAL EXAM - RIGHT EYE: OD_EXAM: PROSTHESIS

## 2024-09-19 ASSESSMENT — PACHYMETRY: OS_CT(UM): 748

## 2024-09-19 ASSESSMENT — ENCOUNTER SYMPTOMS
ENDOCRINE NEGATIVE: 0
MUSCULOSKELETAL NEGATIVE: 0
EYES NEGATIVE: 0
CONSTITUTIONAL NEGATIVE: 0
HEMATOLOGIC/LYMPHATIC NEGATIVE: 0
NEUROLOGICAL NEGATIVE: 0
PSYCHIATRIC NEGATIVE: 0
GASTROINTESTINAL NEGATIVE: 0
RESPIRATORY NEGATIVE: 0
CARDIOVASCULAR NEGATIVE: 0
ALLERGIC/IMMUNOLOGIC NEGATIVE: 0

## 2024-09-19 ASSESSMENT — SLIT LAMP EXAM - LIDS
COMMENTS: NORMAL
COMMENTS: PROSTHESIS

## 2024-09-19 ASSESSMENT — TONOMETRY
OS_IOP_MMHG: 11
IOP_METHOD: GOLDMANN APPLANATION

## 2024-09-19 ASSESSMENT — VISUAL ACUITY
OS_SC: LP
METHOD: SNELLEN - LINEAR

## 2024-11-14 ENCOUNTER — HOSPITAL ENCOUNTER (OUTPATIENT)
Dept: RADIOLOGY | Facility: HOSPITAL | Age: 59
Discharge: HOME | End: 2024-11-14
Payer: COMMERCIAL

## 2024-11-14 ENCOUNTER — APPOINTMENT (OUTPATIENT)
Dept: RADIOLOGY | Facility: HOSPITAL | Age: 59
End: 2024-11-14
Payer: COMMERCIAL

## 2024-11-14 DIAGNOSIS — S22.000A COMPRESSION FRACTURE OF BODY OF THORACIC VERTEBRA (MULTI): ICD-10-CM

## 2024-11-14 PROCEDURE — 77080 DXA BONE DENSITY AXIAL: CPT | Performed by: RADIOLOGY

## 2024-11-14 PROCEDURE — 77080 DXA BONE DENSITY AXIAL: CPT

## 2024-11-15 DIAGNOSIS — M85.852 OSTEOPENIA OF NECKS OF BOTH FEMURS: Primary | ICD-10-CM

## 2024-11-15 DIAGNOSIS — M85.851 OSTEOPENIA OF NECKS OF BOTH FEMURS: Primary | ICD-10-CM

## 2024-11-15 RX ORDER — PSYLLIUM HUSK 0.4 G
1 CAPSULE ORAL DAILY
Qty: 90 TABLET | Refills: 3 | Status: SHIPPED | OUTPATIENT
Start: 2024-11-15

## 2024-11-25 DIAGNOSIS — H40.1123 PRIMARY OPEN ANGLE GLAUCOMA (POAG) OF LEFT EYE, SEVERE STAGE: ICD-10-CM

## 2024-11-25 RX ORDER — BRIMONIDINE TARTRATE 2 MG/ML
1 SOLUTION/ DROPS OPHTHALMIC 2 TIMES DAILY
Qty: 15 ML | Refills: 3 | Status: SHIPPED | OUTPATIENT
Start: 2024-11-25 | End: 2025-11-25

## 2024-12-11 ENCOUNTER — PROCEDURE VISIT (OUTPATIENT)
Dept: PODIATRY | Facility: CLINIC | Age: 59
End: 2024-12-11
Payer: COMMERCIAL

## 2024-12-11 DIAGNOSIS — M20.11 HALLUX VALGUS, BILATERAL: ICD-10-CM

## 2024-12-11 DIAGNOSIS — H54.8 LEGALLY BLIND: ICD-10-CM

## 2024-12-11 DIAGNOSIS — M79.671 FOOT PAIN, BILATERAL: Primary | ICD-10-CM

## 2024-12-11 DIAGNOSIS — M79.672 FOOT PAIN, BILATERAL: Primary | ICD-10-CM

## 2024-12-11 DIAGNOSIS — M20.12 HALLUX VALGUS, BILATERAL: ICD-10-CM

## 2024-12-11 DIAGNOSIS — B35.1 ONYCHOMYCOSIS: ICD-10-CM

## 2024-12-11 DIAGNOSIS — M20.5X1 ADDUCTOVARUS ROTATION OF TOE, ACQUIRED, RIGHT: ICD-10-CM

## 2024-12-11 PROCEDURE — G2211 COMPLEX E/M VISIT ADD ON: HCPCS | Performed by: PODIATRIST

## 2024-12-11 PROCEDURE — 99213 OFFICE O/P EST LOW 20 MIN: CPT | Performed by: PODIATRIST

## 2024-12-11 ASSESSMENT — ENCOUNTER SYMPTOMS
RESPIRATORY NEGATIVE: 1
CARDIOVASCULAR NEGATIVE: 1
ALLERGIC/IMMUNOLOGIC NEGATIVE: 1
CONSTITUTIONAL NEGATIVE: 1
NEUROLOGICAL NEGATIVE: 1
PSYCHIATRIC NEGATIVE: 1
GASTROINTESTINAL NEGATIVE: 1
HEMATOLOGIC/LYMPHATIC NEGATIVE: 1
ENDOCRINE NEGATIVE: 1

## 2024-12-11 NOTE — PROGRESS NOTES
Chief Complaint   Patient presents with    Nail Care     Pt here today for nail care      Follow-up right foot.  Overall doing much better.  Wearing better shoes.  She does complain of some discomfort with the nails.  She does go for pedicures which are helpful as well.  She has no new complaints no changes past medical history.   Patient is legally blind.  She has no other complaints or therapy at this time.    Review of Systems   Constitutional: Negative.    Eyes:  Positive for visual disturbance.   Respiratory: Negative.     Cardiovascular: Negative.    Gastrointestinal: Negative.    Endocrine: Negative.    Musculoskeletal:         Foot pain     Skin: Negative.    Allergic/Immunologic: Negative.    Neurological: Negative.    Hematological: Negative.    Psychiatric/Behavioral: Negative.       General/Constitutional: Alert. NAD.   Respiratory: Non labored breathing.   Psychiatric: Mood and affect normal/baseline.   HEENT: Sclera clear. Wearing corrective lenses.  Dermatologic: Mildly dystrophic elongated nails.  Mild pain on palpation.  No acute inflammatory process.  They are dystrophic.  Webspaces are dry.  No ulcers no pre-ulcerative areas.  Heloma durum fifth digit right foot greatly improved.  Minimal discomfort.  Vascular: Pedal pulses are intact and symmetric including the dorsalis pedis and the posterior tibial pulses. Feet are warm to touch. No swelling appreciated either extremity.  Neurological: Alert and oriented. No acute distress. No sensory impairment bilateral.  Musculoskeletal: Strength is normal for age. No acute deficits appreciated.  Adductovarus fifth digit right foot.  Hallux valgus bilaterally.    Impression: Improvement with previous painful HD 5 with adductovarus right foot.  Onychomycosis.  Hallux valgus deformity.  Legally blind.    -Today's treatment and course of therapy was discussed with the patient in detail. Patient's questions were answered. Proper foot care was discussed. This  dictation was done using Dragon computer software and as such may contain grammatical errors.    -Nail debridement was performed this was a greater than 6 nails. Nails were manually debrided.  They were decreased and girth in length. Appropriate care was discussed. Preventative care was reviewed. Follow-up in about 2 months unless there is any other problems.    -Seasonal footcare discussed.    -Chart reviewed for new labs and notes.

## 2024-12-24 ENCOUNTER — OFFICE VISIT (OUTPATIENT)
Facility: HOSPITAL | Age: 59
End: 2024-12-24
Payer: COMMERCIAL

## 2024-12-24 ENCOUNTER — APPOINTMENT (OUTPATIENT)
Dept: PRIMARY CARE | Facility: CLINIC | Age: 59
End: 2024-12-24
Payer: COMMERCIAL

## 2024-12-24 VITALS
DIASTOLIC BLOOD PRESSURE: 86 MMHG | WEIGHT: 129.7 LBS | SYSTOLIC BLOOD PRESSURE: 127 MMHG | HEIGHT: 59 IN | OXYGEN SATURATION: 99 % | HEART RATE: 65 BPM | BODY MASS INDEX: 26.15 KG/M2 | TEMPERATURE: 98 F

## 2024-12-24 DIAGNOSIS — I10 PRIMARY HYPERTENSION: ICD-10-CM

## 2024-12-24 DIAGNOSIS — Z86.39 HISTORY OF THYROID DISEASE: ICD-10-CM

## 2024-12-24 DIAGNOSIS — Z00.00 ROUTINE HEALTH MAINTENANCE: Primary | ICD-10-CM

## 2024-12-24 DIAGNOSIS — E78.2 MIXED HYPERLIPIDEMIA: ICD-10-CM

## 2024-12-24 PROBLEM — G47.00 INSOMNIA: Status: ACTIVE | Noted: 2024-12-24

## 2024-12-24 PROBLEM — E83.52 HYPERCALCEMIA: Status: ACTIVE | Noted: 2024-12-24

## 2024-12-24 PROBLEM — E55.9 VITAMIN D DEFICIENCY: Status: ACTIVE | Noted: 2024-12-24

## 2024-12-24 PROCEDURE — 3079F DIAST BP 80-89 MM HG: CPT

## 2024-12-24 PROCEDURE — G0439 PPPS, SUBSEQ VISIT: HCPCS

## 2024-12-24 PROCEDURE — 99213 OFFICE O/P EST LOW 20 MIN: CPT

## 2024-12-24 PROCEDURE — 3008F BODY MASS INDEX DOCD: CPT

## 2024-12-24 PROCEDURE — 99213 OFFICE O/P EST LOW 20 MIN: CPT | Mod: 25,GE

## 2024-12-24 PROCEDURE — 99215 OFFICE O/P EST HI 40 MIN: CPT | Mod: 25,GC

## 2024-12-24 PROCEDURE — 3074F SYST BP LT 130 MM HG: CPT

## 2024-12-24 RX ORDER — SIMVASTATIN 20 MG/1
20 TABLET, FILM COATED ORAL DAILY
Qty: 90 TABLET | Refills: 3 | Status: SHIPPED | OUTPATIENT
Start: 2024-12-24 | End: 2025-12-24

## 2024-12-24 RX ORDER — TRIAMTERENE AND HYDROCHLOROTHIAZIDE 37.5; 25 MG/1; MG/1
1 CAPSULE ORAL DAILY
Qty: 90 CAPSULE | Refills: 3 | Status: SHIPPED | OUTPATIENT
Start: 2024-12-24 | End: 2025-12-24

## 2024-12-24 ASSESSMENT — ACTIVITIES OF DAILY LIVING (ADL)
GROCERY_SHOPPING: INDEPENDENT
TAKING_MEDICATION: INDEPENDENT
DRESSING: INDEPENDENT
BATHING: INDEPENDENT
MANAGING_FINANCES: INDEPENDENT
DOING_HOUSEWORK: NEEDS ASSISTANCE

## 2024-12-24 ASSESSMENT — PATIENT HEALTH QUESTIONNAIRE - PHQ9
SUM OF ALL RESPONSES TO PHQ9 QUESTIONS 1 AND 2: 0
SUM OF ALL RESPONSES TO PHQ9 QUESTIONS 1 AND 2: 0
2. FEELING DOWN, DEPRESSED OR HOPELESS: NOT AT ALL
1. LITTLE INTEREST OR PLEASURE IN DOING THINGS: NOT AT ALL
SUM OF ALL RESPONSES TO PHQ9 QUESTIONS 1 AND 2: 0
1. LITTLE INTEREST OR PLEASURE IN DOING THINGS: NOT AT ALL
1. LITTLE INTEREST OR PLEASURE IN DOING THINGS: NOT AT ALL

## 2024-12-24 ASSESSMENT — ENCOUNTER SYMPTOMS
LOSS OF SENSATION IN FEET: 0
OCCASIONAL FEELINGS OF UNSTEADINESS: 0
DEPRESSION: 0

## 2024-12-24 ASSESSMENT — COLUMBIA-SUICIDE SEVERITY RATING SCALE - C-SSRS
6. HAVE YOU EVER DONE ANYTHING, STARTED TO DO ANYTHING, OR PREPARED TO DO ANYTHING TO END YOUR LIFE?: NO
2. HAVE YOU ACTUALLY HAD ANY THOUGHTS OF KILLING YOURSELF?: NO
1. IN THE PAST MONTH, HAVE YOU WISHED YOU WERE DEAD OR WISHED YOU COULD GO TO SLEEP AND NOT WAKE UP?: NO

## 2024-12-24 NOTE — PROGRESS NOTES
"Subjective   Patient ID: Marialuisa Marie is a 59 y.o. female who presents for Physical (medication).    # Health Maintenance  - Last prior HM: last december  - Patient's rating of their own health: Good  - Dental Care: last prior dental visit in March // brushes teeth once daily // flosses teeth once daily // denies current tooth pain  - Vision: last prior ophtho visit 2024, follows regularly  - Hearing: denies recent hearing loss  - Diet: \"good\", eats a lot of salad; home health aide helps with cooking  - Exercise: uses treadmill for an hour twice a week  - Weight: stable  - Smoking: never a smoker  Tobacco Use: Low Risk  (2024)    Patient History     Smoking Tobacco Use: Never     Smokeless Tobacco Use: Never     Passive Exposure: Not on file   - EtOH: Alcohol Use: No, patient does not drink alcohol.  - Illicit substances: denied.  - Employment: SSI, used to be a tailor, and worked as a lunch  at schools  - Living Situation: lives with son, rents an apartment; no issues with housing or food security  - Colon CA: last prior screening Colonoscopy in  // family h/o colon ca? No    WOMEN  - Menstrual Status: Primary amenorrhea  - No LMP recorded. Patient is postmenopausal.  - Pregnancy history:   - Bladder dysfunction? has not had any episodes of incontinence  - Cervical CA: last prior pap , requires f/up in  // h/o abnormal pap? Yes, describe: ASCUS in  w/ negative HPV  - Breast CA: last prior mammo 2024 // h/o abnormal mammo? 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, severe stage    History of eye prosthesis    Hyperlipidemia    Legally blind    " Pseudophakia of left eye    Subclinical hyperthyroidism    Absolute glaucoma    Cervical cancer screening    Generalized psoriasis    Foot pain, bilateral     Past Medical History:   Diagnosis Date    Aphakia, left eye 02/13/2017    Aphakia of left eye    Aphakia, unspecified eye 02/13/2017    Aphakic corneal edema    Breast lump on left side at 2 o'clock position 05/26/2023    Congenital glaucoma 06/22/2021    Congenital glaucoma    Left arm numbness 05/26/2023    Neck muscle spasm 05/26/2023    Weight loss, unintentional 05/26/2023       SurgHx:  History reviewed. No pertinent surgical history.    Meds:  Current Outpatient Medications   Medication Sig Dispense Refill    alendronate (Fosamax) 70 mg tablet Take 1 tablet (70 mg) by mouth every 7 days. Take in the morning with a full glass of water, on an empty stomach, and do not take anything else by mouth or lie down for the next 30 min. 4 tablet 11    benzonatate (Tessalon) 200 mg capsule Take 1 capsule (200 mg) by mouth 3 times a day as needed for cough. 30 capsule 0    brimonidine (AlphaGAN) 0.2 % ophthalmic solution Administer 1 drop into the left eye 2 times a day. 15 mL 3    calcium carbonate-vitamin D3 1,000 mg-20 mcg (800 unit) tablet Take 1 tablet by mouth once daily. 90 tablet 3    cetirizine (ZyrTEC) 10 mg tablet Take by mouth once daily.      dorzolamide-timoloL (Cosopt) 22.3-6.8 mg/mL ophthalmic solution INSTILL 1 DROP IN LEFT EYE TWICE DAILY 15 mL 3    erythromycin (Romycin) 5 mg/gram (0.5 %) ophthalmic ointment Apply to left eye 2 times a day. 0.5 MG (a quarter-inch strip)      hydrocortisone 2.5 % ointment Apply topically 2 times a day as needed for irritation or rash. 30 g 2    lanolin-mineral oil (Eucerin Original) lotion 480 Applications.      latanoprost (Xalatan) 0.005 % ophthalmic solution INSTILL 1 DROP IN THE LEFT EYE EVERY NIGHT AT BEDTIME 2.5 mL 3    metoprolol succinate XL (Toprol-XL) 25 mg 24 hr tablet Take 1 tablet (25 mg) by mouth once  "daily. 90 tablet 3    miscellaneous medical supply [facility] 1 each once daily. Please dispense automated blood pressure device that is most adequately covered by patient's insurance plan. 1 each 0    moxifloxacin (Vigamox) 0.5 % ophthalmic solution INSTILL 1 DROP IN LEFT EYE FOUR TIMES DAILY      orphenadrine (Norflex) 100 mg 12 hr tablet Take 1 tablet (100 mg) by mouth 2 times a day as needed for muscle spasms. Do not crush, chew, or split. 14 tablet 0    simvastatin (Zocor) 20 mg tablet Take 1 tablet (20 mg) by mouth once daily. as directed 90 tablet 3    tobramycin-dexamethasone (Tobradex) ophthalmic ointment Apply 0.5 inches to affected eye(s) 3 times a day. 3.5 g 0    triamcinolone (Kenalog) 0.1 % ointment 300 Applications every 12 hours.      triamterene-hydrochlorothiazid (Dyazide) 37.5-25 mg capsule Take 1 capsule by mouth once daily. 90 capsule 3     No current facility-administered medications for this visit.        Patient care team:  Patient Care Team:  Lane Patel MD as PCP - [facility] PCP  Kalina Richardson MD as Consulting Physician (Family Medicine)     Objective     Vitals: /86 (BP Location: Right arm, Patient Position: Sitting, BP Cuff Size: Adult)   Pulse 65   Temp 36.7 °C (98 °F) (Temporal)   Ht 1.499 m (4' 11\")   Wt 58.8 kg (129 lb 11.2 oz)   SpO2 99%   BMI 26.20 kg/m²      Physical Exam  General:  Well developed. Alert. No acute distress.  Skin:  Warm, dry. normal skin turgor. no rashes. no lesions.   Head: NCAT  EENT: MMM  Cardiovascular:  Regular rate and rhythm, normal S1 and S2, no gallops, no murmurs and no pericardial rub.  Pulmonary:  CTAB in all fields  Abdomen:  (+) BS. soft. non-tender. non-distended. no abdominal masses. no guarding or rigidity.  Neurologic:  grossly intact  Musculoskeletal: moves all extremities spontaneously  Psychiatric:  appropriate mood and affect    PHQ2:  Over the past 2 weeks, how often have you been bothered by any of the following problems?  Little " interest or pleasure in doing things: Not at all  Feeling down, depressed, or hopeless: Not at all  Patient Health Questionnaire-2 Score: 0    Food insecurity:  Food Insecurity: Unknown (12/29/2022)    [facility]    Hunger Vital Sign     Worried About Running Out of Food in the Last Year: Never true     Ran Out of Food in the Last Year: Not on file       Assessment/Plan   60 yo F here for medicare wellness visit.     Routine Health Maintenance  - Flu vaccine: recommended annually, declined  - COVID vaccine: recommended completion of primary series and recommended boosters  - Prevnar (PCV20): declined  - Tdap: no documentation of previous administration, declined  - Shingrix (50+): declined  - Lipid Panel (35M,45F):   Lab Results   Component Value Date    CHOL 171 04/12/2024    CHOL 184 04/08/2021    CHOL 163 11/21/2019     Lab Results   Component Value Date    HDL 54.9 04/12/2024    HDL 64.1 04/08/2021    HDL 61.2 11/21/2019     Lab Results   Component Value Date    LDLCALC 100 (H) 04/12/2024     Lab Results   Component Value Date    TRIG 79 04/12/2024    TRIG 90 04/08/2021    TRIG 66 11/21/2019   - DM screening: ordered today  - HTN screening: wnl today  - Food Insecurity screen: negative  - Depression: PHQ-2 negative  - Tobacco Cessation: n/a   - Last Dental: recommended follow up every 6mo   - Last Eye exam: recommended follow up annually   - Colonoscopy (45-75): due 2027  - Pap smear (21-65F): repeat in 2026 based on ASCCP guidelines (ASCUS, -ve HPV 2023)   - Breast CA screening (40-74F): negative 5/2024  - Osteoporosis (65+F): n/a    Routine health maintenance  -     Hemoglobin A1c; Future    Mixed hyperlipidemia  -     simvastatin (Zocor) 20 mg tablet; Take 1 tablet (20 mg) by mouth once daily. as directed    Primary hypertension  -     triamterene-hydrochlorothiazid (Dyazide) 37.5-25 mg capsule; Take 1 capsule by mouth once daily.  -     Hemoglobin A1c; Future  -      Renal function panel; Future  -     Magnesium; Future    Advanced care planning  - discussed role of healthcare proxy and advanced care documents  - provided resources for completion     RTC in 2-3 mos, or earlier as needed.  Attending Supervision: seen and discussed with attending physician Dr. Lane Patel.   Cecelia Beck MD  Family Medicine Resident.

## 2024-12-31 ENCOUNTER — LAB (OUTPATIENT)
Dept: LAB | Facility: LAB | Age: 59
End: 2024-12-31
Payer: COMMERCIAL

## 2024-12-31 DIAGNOSIS — Z00.00 ROUTINE HEALTH MAINTENANCE: ICD-10-CM

## 2024-12-31 DIAGNOSIS — Z86.39 HISTORY OF THYROID DISEASE: ICD-10-CM

## 2024-12-31 DIAGNOSIS — I10 PRIMARY HYPERTENSION: ICD-10-CM

## 2024-12-31 DIAGNOSIS — E78.2 MIXED HYPERLIPIDEMIA: ICD-10-CM

## 2024-12-31 LAB
ALBUMIN SERPL BCP-MCNC: 4.7 G/DL (ref 3.4–5)
ANION GAP SERPL CALC-SCNC: 13 MMOL/L (ref 10–20)
BUN SERPL-MCNC: 12 MG/DL (ref 6–23)
CALCIUM SERPL-MCNC: 10.5 MG/DL (ref 8.6–10.6)
CHLORIDE SERPL-SCNC: 97 MMOL/L (ref 98–107)
CO2 SERPL-SCNC: 32 MMOL/L (ref 21–32)
CREAT SERPL-MCNC: 0.63 MG/DL (ref 0.5–1.05)
EGFRCR SERPLBLD CKD-EPI 2021: >90 ML/MIN/1.73M*2
EST. AVERAGE GLUCOSE BLD GHB EST-MCNC: 108 MG/DL
GLUCOSE SERPL-MCNC: 72 MG/DL (ref 74–99)
HBA1C MFR BLD: 5.4 %
MAGNESIUM SERPL-MCNC: 2.41 MG/DL (ref 1.6–2.4)
PHOSPHATE SERPL-MCNC: 4.2 MG/DL (ref 2.5–4.9)
POTASSIUM SERPL-SCNC: 3.8 MMOL/L (ref 3.5–5.3)
SODIUM SERPL-SCNC: 138 MMOL/L (ref 136–145)

## 2024-12-31 PROCEDURE — 83735 ASSAY OF MAGNESIUM: CPT

## 2024-12-31 PROCEDURE — 80069 RENAL FUNCTION PANEL: CPT

## 2024-12-31 PROCEDURE — 83036 HEMOGLOBIN GLYCOSYLATED A1C: CPT

## 2025-01-16 ENCOUNTER — APPOINTMENT (OUTPATIENT)
Dept: OPHTHALMOLOGY | Facility: CLINIC | Age: 60
End: 2025-01-16
Payer: COMMERCIAL

## 2025-01-17 DIAGNOSIS — H40.1123 PRIMARY OPEN ANGLE GLAUCOMA (POAG) OF LEFT EYE, SEVERE STAGE: Primary | ICD-10-CM

## 2025-01-17 RX ORDER — DORZOLAMIDE HYDROCHLORIDE AND TIMOLOL MALEATE 20; 5 MG/ML; MG/ML
SOLUTION/ DROPS OPHTHALMIC
Qty: 15 ML | Refills: 3 | Status: SHIPPED | OUTPATIENT
Start: 2025-01-17

## 2025-01-20 DIAGNOSIS — H40.1123 PRIMARY OPEN ANGLE GLAUCOMA (POAG) OF LEFT EYE, SEVERE STAGE: Primary | ICD-10-CM

## 2025-01-20 RX ORDER — BRIMONIDINE TARTRATE 2 MG/ML
1 SOLUTION/ DROPS OPHTHALMIC 2 TIMES DAILY
Qty: 15 ML | Refills: 3 | Status: SHIPPED | OUTPATIENT
Start: 2025-01-20 | End: 2026-01-20

## 2025-02-17 ENCOUNTER — OFFICE VISIT (OUTPATIENT)
Facility: HOSPITAL | Age: 60
End: 2025-02-17
Payer: COMMERCIAL

## 2025-02-17 VITALS
BODY MASS INDEX: 25.8 KG/M2 | OXYGEN SATURATION: 100 % | WEIGHT: 128 LBS | DIASTOLIC BLOOD PRESSURE: 89 MMHG | HEIGHT: 59 IN | SYSTOLIC BLOOD PRESSURE: 130 MMHG | HEART RATE: 70 BPM

## 2025-02-17 DIAGNOSIS — I10 PRIMARY HYPERTENSION: Primary | ICD-10-CM

## 2025-02-17 DIAGNOSIS — R09.82 POST-NASAL DRIP: ICD-10-CM

## 2025-02-17 DIAGNOSIS — L40.9 GENERALIZED PSORIASIS: ICD-10-CM

## 2025-02-17 DIAGNOSIS — H54.8 LEGALLY BLIND: ICD-10-CM

## 2025-02-17 DIAGNOSIS — R05.2 SUBACUTE COUGH: ICD-10-CM

## 2025-02-17 PROCEDURE — 3008F BODY MASS INDEX DOCD: CPT

## 2025-02-17 PROCEDURE — 99213 OFFICE O/P EST LOW 20 MIN: CPT | Mod: GE

## 2025-02-17 PROCEDURE — 99213 OFFICE O/P EST LOW 20 MIN: CPT

## 2025-02-17 PROCEDURE — 3075F SYST BP GE 130 - 139MM HG: CPT

## 2025-02-17 PROCEDURE — 3079F DIAST BP 80-89 MM HG: CPT

## 2025-02-17 RX ORDER — MINERAL OIL
180 ENEMA (ML) RECTAL DAILY
Qty: 30 TABLET | Refills: 5 | Status: SHIPPED | OUTPATIENT
Start: 2025-02-17 | End: 2026-02-17

## 2025-02-17 RX ORDER — TRIAMCINOLONE ACETONIDE 1 MG/G
1 OINTMENT TOPICAL 2 TIMES DAILY
Qty: 6 G | Refills: 1 | Status: SHIPPED | OUTPATIENT
Start: 2025-02-17 | End: 2025-04-18

## 2025-02-17 ASSESSMENT — ENCOUNTER SYMPTOMS
CHILLS: 0
CHEST TIGHTNESS: 0
ACTIVITY CHANGE: 0
RHINORRHEA: 0
FEVER: 0
SHORTNESS OF BREATH: 0
COUGH: 1
UNEXPECTED WEIGHT CHANGE: 0
WHEEZING: 0

## 2025-02-17 NOTE — PROGRESS NOTES
"Subjective   Patient ID:   Marialuisa Marie is a 59 y.o. female who presents for Follow-up.  HPI  #HTN  - Checks BP at home 3 times per day  - uses a wrist cuff that talks to her, has home health aide that also assists with taking/recording pressures  - Average readings at home 120s/80s  - Takes her BP medications daily  - Denied any headaches, lightheadedness, dizziness, or falls    #Cough  - dry, persistent  - Denied any fever, chills, sick contacts, travel or coughing fits  - Denied ever smoking   - denies hx seasonal allergies, but does note this cough and post nasal drainage are constant throughout the year    #rash of scalp  - previous dermatitis of scalp, states she previously used a topical steroid cream to treat  - has noticed one patch on the back of her head  - requesting refill of steroid cream      Review of Systems   Constitutional:  Negative for activity change, chills, fever and unexpected weight change.   HENT:  Positive for postnasal drip. Negative for congestion, hearing loss and rhinorrhea.    Respiratory:  Positive for cough. Negative for chest tightness, shortness of breath and wheezing.    Cardiovascular:  Negative for chest pain.   Skin:  Positive for rash.       Objective   /89   Pulse 70   Ht 1.499 m (4' 11\")   Wt 58.1 kg (128 lb)   SpO2 100%   BMI 25.85 kg/m²      Physical Exam  Constitutional:       General: She is not in acute distress.     Appearance: Normal appearance. She is not ill-appearing.   HENT:      Head: Normocephalic and atraumatic.      Nose:      Comments: Boggy inferior nasal turbinates bilaterally with clear drainage seen.   Cardiovascular:      Rate and Rhythm: Normal rate and regular rhythm.      Heart sounds: Normal heart sounds. No murmur heard.     No friction rub. No gallop.   Pulmonary:      Effort: Pulmonary effort is normal. No respiratory distress.      Breath sounds: Normal breath sounds. No stridor. No wheezing, rhonchi or rales.   Musculoskeletal: "      Cervical back: Normal range of motion.   Neurological:      General: No focal deficit present.      Mental Status: She is alert.   Psychiatric:         Mood and Affect: Mood normal.         Behavior: Behavior normal.         Assessment/Plan     Problem List Items Addressed This Visit       Legally blind    Primary hypertension - Primary    Generalized psoriasis   Marialuisa Marie is a 58 year old female with a PMHx of HTN, HLD, and subclinical hypothyroidism who presents to the clinic for a follow up.    #HTN  - IO /89  - c/w Triamterene-hydrochlorothiazide 37.5-25 mg every day & Metoprolol 25 mg every day  - Encouraged to continue taking BP regularly and keep a log    #cough  #post nasal drip  - symptoms and exam consistent with possible allergic rhinitis  - Start daily allegra    #generalized psoriasis  #rash of scalp  - rx provided for triamcinolone    RTC in 6 months for routine follow-up and monitoring of chronic diseases.        The patient was discussed with attending physician, Noemí Mary MD.    Radha Mathews, DO  Family Medicine, R2

## 2025-04-10 DIAGNOSIS — I10 PRIMARY HYPERTENSION: ICD-10-CM

## 2025-04-10 RX ORDER — TRIAMTERENE AND HYDROCHLOROTHIAZIDE 37.5; 25 MG/1; MG/1
1 CAPSULE ORAL DAILY
Qty: 90 CAPSULE | Refills: 3 | Status: SHIPPED | OUTPATIENT
Start: 2025-04-10 | End: 2026-04-10

## 2025-06-08 DIAGNOSIS — S22.000A COMPRESSION FRACTURE OF BODY OF THORACIC VERTEBRA (MULTI): ICD-10-CM

## 2025-06-10 RX ORDER — ALENDRONATE SODIUM 70 MG/1
TABLET ORAL
Qty: 4 TABLET | Refills: 11 | Status: SHIPPED | OUTPATIENT
Start: 2025-06-10

## 2025-06-26 ENCOUNTER — APPOINTMENT (OUTPATIENT)
Dept: OPHTHALMOLOGY | Facility: CLINIC | Age: 60
End: 2025-06-26
Payer: COMMERCIAL

## 2025-06-30 ENCOUNTER — APPOINTMENT (OUTPATIENT)
Dept: PODIATRY | Facility: CLINIC | Age: 60
End: 2025-06-30
Payer: COMMERCIAL

## 2025-07-07 ENCOUNTER — APPOINTMENT (OUTPATIENT)
Dept: PODIATRY | Facility: CLINIC | Age: 60
End: 2025-07-07
Payer: COMMERCIAL

## 2025-07-14 ENCOUNTER — OFFICE VISIT (OUTPATIENT)
Dept: PODIATRY | Facility: CLINIC | Age: 60
End: 2025-07-14
Payer: COMMERCIAL

## 2025-07-14 DIAGNOSIS — M20.5X1 ADDUCTOVARUS ROTATION OF TOE, ACQUIRED, RIGHT: ICD-10-CM

## 2025-07-14 DIAGNOSIS — H54.8 LEGALLY BLIND: ICD-10-CM

## 2025-07-14 DIAGNOSIS — B35.1 ONYCHOMYCOSIS: ICD-10-CM

## 2025-07-14 DIAGNOSIS — M20.11 HALLUX VALGUS, BILATERAL: ICD-10-CM

## 2025-07-14 DIAGNOSIS — M79.672 FOOT PAIN, BILATERAL: Primary | ICD-10-CM

## 2025-07-14 DIAGNOSIS — M79.671 FOOT PAIN, BILATERAL: Primary | ICD-10-CM

## 2025-07-14 DIAGNOSIS — M20.12 HALLUX VALGUS, BILATERAL: ICD-10-CM

## 2025-07-14 PROCEDURE — 99214 OFFICE O/P EST MOD 30 MIN: CPT | Performed by: PODIATRIST

## 2025-07-14 PROCEDURE — 1036F TOBACCO NON-USER: CPT | Performed by: PODIATRIST

## 2025-07-14 ASSESSMENT — ENCOUNTER SYMPTOMS
NEUROLOGICAL NEGATIVE: 1
ENDOCRINE NEGATIVE: 1
CARDIOVASCULAR NEGATIVE: 1
GASTROINTESTINAL NEGATIVE: 1
ALLERGIC/IMMUNOLOGIC NEGATIVE: 1
HEMATOLOGIC/LYMPHATIC NEGATIVE: 1
CONSTITUTIONAL NEGATIVE: 1
PSYCHIATRIC NEGATIVE: 1
RESPIRATORY NEGATIVE: 1

## 2025-07-14 NOTE — PROGRESS NOTES
Chief Complaint   Patient presents with    Follow-up     NAIL CARE   Established patient has chief complaint of pain posterior aspect left heel.  Some intermittent discomfort.  This been on for a couple of weeks now.  No known trauma.  She thinks it bothers her most when she first goes to bed.  Now waking her at night.  No difficulty with walking.  Patient is legally blind.  She also complains of thickened painful nails on both feet.  Difficulty with shoe gear.  History of ingrown nails.  Gets intermittent pedicures.  She has no other complaints at this time.      Review of Systems   Constitutional: Negative.    Eyes:  Positive for visual disturbance.   Respiratory: Negative.     Cardiovascular: Negative.    Gastrointestinal: Negative.    Endocrine: Negative.    Musculoskeletal:         Foot pain     Skin: Negative.    Allergic/Immunologic: Negative.    Neurological: Negative.    Hematological: Negative.    Psychiatric/Behavioral: Negative.       General/Constitutional: Alert. NAD.   Respiratory: Non labored breathing.   Psychiatric: Mood and affect normal/baseline.   Dermatologic: Nails are very elongated.  Incurvated.  Pain on palpation.  No acute inflammatory process.  They are dystrophic.  Subungual debris on multiple nails.  Webspaces are dry.  No ulcers no pre-ulcerative areas.  No further heloma durum right foot.  Vascular: Pedal pulses are intact and symmetric including the dorsalis pedis and the posterior tibial pulses. Feet are warm to touch. No swelling appreciated either extremity.  Neurological: Alert and oriented. No acute distress. No sensory impairment bilateral.  Musculoskeletal: Strength is normal for age. No acute deficits appreciated.  No appreciable palpable pain posterior aspect of left heel.  No plantar fascial pain no tarsal tunnel pain no Tinel's pain no soft tissue induration noted.  No pain with hindfoot range of motion.  Adductovarus fifth digit right foot.  Hallux valgus  bilaterally.    Impression: Plantar fasciitis left foot.  Improvement with previous painful HD 5 with adductovarus right foot.  Painful onychomycosis.  Hallux valgus deformity.  Legally blind.    -Today's treatment and course of therapy was discussed with the patient in detail. Patient's questions were answered. Proper foot care was discussed. This dictation was done using Dragon computer software and as such may contain grammatical errors.    -Nail debridement was performed this was a greater than 6 nails. Nails were manually debrided.  They were decreased and girth in length. Appropriate care was discussed. Preventative care was reviewed.  Will see you back in 6 months.  Intermittent pedicures are okay.    - Recommend use of Voltaren gel at nighttime.  This can be of benefit.    -Also recommend use of heel cushion in the shoe for comfort.    -If there is any worsening or this does not help please let me know.  We can consider getting x-rays or a cortisone injection as needed.    -Seasonal footcare discussed.    -Chart reviewed for new labs and notes.

## 2025-07-17 ENCOUNTER — HOSPITAL ENCOUNTER (OUTPATIENT)
Dept: RADIOLOGY | Facility: HOSPITAL | Age: 60
Discharge: HOME | End: 2025-07-17
Payer: COMMERCIAL

## 2025-07-17 DIAGNOSIS — Z12.31 ENCOUNTER FOR SCREENING MAMMOGRAM FOR MALIGNANT NEOPLASM OF BREAST: ICD-10-CM

## 2025-07-17 PROCEDURE — 77063 BREAST TOMOSYNTHESIS BI: CPT

## 2025-07-17 PROCEDURE — 77063 BREAST TOMOSYNTHESIS BI: CPT | Performed by: RADIOLOGY

## 2025-07-17 PROCEDURE — 77067 SCR MAMMO BI INCL CAD: CPT | Performed by: RADIOLOGY

## 2025-11-20 ENCOUNTER — APPOINTMENT (OUTPATIENT)
Dept: OPHTHALMOLOGY | Facility: CLINIC | Age: 60
End: 2025-11-20
Payer: COMMERCIAL